# Patient Record
Sex: FEMALE | Race: BLACK OR AFRICAN AMERICAN | Employment: FULL TIME | URBAN - METROPOLITAN AREA
[De-identification: names, ages, dates, MRNs, and addresses within clinical notes are randomized per-mention and may not be internally consistent; named-entity substitution may affect disease eponyms.]

---

## 2022-04-10 ENCOUNTER — APPOINTMENT (OUTPATIENT)
Dept: CT IMAGING | Age: 37
End: 2022-04-10
Attending: NURSE PRACTITIONER

## 2022-04-10 ENCOUNTER — HOSPITAL ENCOUNTER (EMERGENCY)
Age: 37
Discharge: HOME | End: 2022-04-10
Attending: STUDENT IN AN ORGANIZED HEALTH CARE EDUCATION/TRAINING PROGRAM | Admitting: STUDENT IN AN ORGANIZED HEALTH CARE EDUCATION/TRAINING PROGRAM

## 2022-04-10 ENCOUNTER — APPOINTMENT (OUTPATIENT)
Dept: GENERAL RADIOLOGY | Age: 37
End: 2022-04-10
Attending: NURSE PRACTITIONER

## 2022-04-10 VITALS
DIASTOLIC BLOOD PRESSURE: 73 MMHG | SYSTOLIC BLOOD PRESSURE: 115 MMHG | BODY MASS INDEX: 20.83 KG/M2 | TEMPERATURE: 99.1 F | OXYGEN SATURATION: 97 % | RESPIRATION RATE: 18 BRPM | WEIGHT: 125 LBS | HEART RATE: 92 BPM | HEIGHT: 65 IN

## 2022-04-10 DIAGNOSIS — J98.19 LUNG COLLAPSE: ICD-10-CM

## 2022-04-10 DIAGNOSIS — R05.9 COUGH: Primary | ICD-10-CM

## 2022-04-10 LAB
ANION GAP SERPL CALC-SCNC: 5 MMOL/L (ref 5–15)
BASOPHILS # BLD: 0.1 K/UL (ref 0–0.1)
BASOPHILS NFR BLD: 1 % (ref 0–1)
BUN SERPL-MCNC: 6 MG/DL (ref 6–20)
BUN/CREAT SERPL: 6 (ref 12–20)
CALCIUM SERPL-MCNC: 9.3 MG/DL (ref 8.5–10.1)
CHLORIDE SERPL-SCNC: 104 MMOL/L (ref 97–108)
CO2 SERPL-SCNC: 27 MMOL/L (ref 21–32)
COMMENT, HOLDF: NORMAL
CREAT SERPL-MCNC: 0.94 MG/DL (ref 0.55–1.02)
DIFFERENTIAL METHOD BLD: ABNORMAL
EOSINOPHIL # BLD: 2.8 K/UL (ref 0–0.4)
EOSINOPHIL NFR BLD: 26 % (ref 0–7)
ERYTHROCYTE [DISTWIDTH] IN BLOOD BY AUTOMATED COUNT: 13.2 % (ref 11.5–14.5)
GLUCOSE SERPL-MCNC: 107 MG/DL (ref 65–100)
HCT VFR BLD AUTO: 37.2 % (ref 35–47)
HGB BLD-MCNC: 12.1 G/DL (ref 11.5–16)
IMM GRANULOCYTES # BLD AUTO: 0 K/UL (ref 0–0.04)
IMM GRANULOCYTES NFR BLD AUTO: 0 % (ref 0–0.5)
LYMPHOCYTES # BLD: 1.6 K/UL (ref 0.8–3.5)
LYMPHOCYTES NFR BLD: 15 % (ref 12–49)
MCH RBC QN AUTO: 28.4 PG (ref 26–34)
MCHC RBC AUTO-ENTMCNC: 32.5 G/DL (ref 30–36.5)
MCV RBC AUTO: 87.3 FL (ref 80–99)
MONOCYTES # BLD: 1 K/UL (ref 0–1)
MONOCYTES NFR BLD: 9 % (ref 5–13)
NEUTS SEG # BLD: 5.1 K/UL (ref 1.8–8)
NEUTS SEG NFR BLD: 49 % (ref 32–75)
NRBC # BLD: 0 K/UL (ref 0–0.01)
NRBC BLD-RTO: 0 PER 100 WBC
PLATELET # BLD AUTO: 588 K/UL (ref 150–400)
PMV BLD AUTO: 9.1 FL (ref 8.9–12.9)
POTASSIUM SERPL-SCNC: 3.5 MMOL/L (ref 3.5–5.1)
RBC # BLD AUTO: 4.26 M/UL (ref 3.8–5.2)
RBC MORPH BLD: ABNORMAL
SAMPLES BEING HELD,HOLD: NORMAL
SODIUM SERPL-SCNC: 136 MMOL/L (ref 136–145)
WBC # BLD AUTO: 10.6 K/UL (ref 3.6–11)

## 2022-04-10 PROCEDURE — 99285 EMERGENCY DEPT VISIT HI MDM: CPT

## 2022-04-10 PROCEDURE — 36415 COLL VENOUS BLD VENIPUNCTURE: CPT

## 2022-04-10 PROCEDURE — 85025 COMPLETE CBC W/AUTO DIFF WBC: CPT

## 2022-04-10 PROCEDURE — 74011000636 HC RX REV CODE- 636: Performed by: RADIOLOGY

## 2022-04-10 PROCEDURE — 80048 BASIC METABOLIC PNL TOTAL CA: CPT

## 2022-04-10 PROCEDURE — 71260 CT THORAX DX C+: CPT

## 2022-04-10 PROCEDURE — 71046 X-RAY EXAM CHEST 2 VIEWS: CPT

## 2022-04-10 RX ORDER — PREDNISONE 20 MG/1
40 TABLET ORAL DAILY
Qty: 10 TABLET | Refills: 0 | Status: SHIPPED | OUTPATIENT
Start: 2022-04-10 | End: 2022-04-15

## 2022-04-10 RX ORDER — GUAIFENESIN 1200 MG/1
1200 TABLET, EXTENDED RELEASE ORAL 2 TIMES DAILY
Qty: 14 TABLET | Refills: 0 | Status: SHIPPED | OUTPATIENT
Start: 2022-04-10 | End: 2022-04-17

## 2022-04-10 RX ORDER — DOXYCYCLINE HYCLATE 100 MG
100 TABLET ORAL 2 TIMES DAILY
Qty: 14 TABLET | Refills: 0 | Status: SHIPPED | OUTPATIENT
Start: 2022-04-10 | End: 2022-04-17

## 2022-04-10 RX ADMIN — IOPAMIDOL 100 ML: 612 INJECTION, SOLUTION INTRAVENOUS at 20:10

## 2022-04-10 NOTE — ED TRIAGE NOTES
Pt arrived from [facility] for further testing after having an X ray there. Unable to state what was seen on X ray. Reports cough, SOB X mid January after having COVID. Pt denies being vaccinated. Denies any other complaints.

## 2022-04-10 NOTE — ED PROVIDER NOTES
75-year-old female with a past medical history of asthma presents to the ER today for evaluation of cough. Patient states that she was diagnosed with COVID-19 on January 10, 2022, and since then has had a daily chronic cough. She notices very mild shortness of breath at times, but states that it is not limited her normal activities of daily living. She states that the cough started off a little bit green, became brown, and is now more of yellow appearance. She has been using her prescribed inhaler without any notable improvement in her symptoms. She states that the cough usually gets worse in the evening hours. She was seen in urgent care facility earlier today who completed a PA lateral chest x-ray and referred her here for unclear reasons. She denies any active fevers, myalgias, shortness of breath, chest pain, leg swelling. Patient was unvaccinated prior to cong COVID-19. History reviewed. No pertinent past medical history. No past surgical history on file. History reviewed. No pertinent family history. Social History     Socioeconomic History    Marital status: Not on file     Spouse name: Not on file    Number of children: Not on file    Years of education: Not on file    Highest education level: Not on file   Occupational History    Not on file   Tobacco Use    Smoking status: Not on file    Smokeless tobacco: Not on file   Substance and Sexual Activity    Alcohol use: Not on file    Drug use: Not on file    Sexual activity: Not on file   Other Topics Concern    Not on file   Social History Narrative    Not on file     Social Determinants of Health     Financial Resource Strain:     Difficulty of Paying Living Expenses: Not on file   Food Insecurity:     Worried About Running Out of Food in the Last Year: Not on file    Addison of Food in the Last Year: Not on file   Transportation Needs:     Lack of Transportation (Medical):  Not on file    Lack of Transportation (Non-Medical): Not on file   Physical Activity:     Days of Exercise per Week: Not on file    Minutes of Exercise per Session: Not on file   Stress:     Feeling of Stress : Not on file   Social Connections:     Frequency of Communication with Friends and Family: Not on file    Frequency of Social Gatherings with Friends and Family: Not on file    Attends [employer] Services: Not on file    Active Member of [address] [place] [address] or Organizations: Not on file    Attends Club or Organization Meetings: Not on file    Marital Status: Not on file   Intimate Partner Violence:     Fear of Current or Ex-Partner: Not on file    Emotionally Abused: Not on file    Physically Abused: Not on file    Sexually Abused: Not on file   Housing Stability:     Unable to Pay for Housing in the Last Year: Not on file    Number of [place] in the Last Year: Not on file    Unstable Housing in the Last Year: Not on file         ALLERGIES: Patient has no known allergies. Review of Systems   Constitutional: Negative for fever. HENT: Negative for sore throat. Eyes: Negative for visual disturbance. Respiratory: Positive for cough. Negative for shortness of breath. Cardiovascular: Negative for leg swelling. Gastrointestinal: Negative for vomiting. Genitourinary: Negative for dysuria. Musculoskeletal: Negative for myalgias. Skin: Negative for rash. Neurological: Negative for dizziness. Psychiatric/Behavioral: Negative for dysphoric mood. Vitals:    04/10/22 1753   BP: 115/73   Pulse: 92   Resp: 18   Temp: 99.1 °F (37.3 °C)   SpO2: 97%   Weight: 56.7 kg (125 lb)   Height: 5' 5\" (1.651 m)            Physical Exam  Vitals and nursing note reviewed. Constitutional:       General: She is not in acute distress. Appearance: Normal appearance. She is not ill-appearing. HENT:      Head: Normocephalic and atraumatic.       Right Ear: External ear normal.      Left Ear: External ear normal.      Nose: Nose normal.      Mouth/Throat:      Mouth: Mucous membranes are moist.      Pharynx: Oropharynx is clear. Eyes:      General: No scleral icterus. Extraocular Movements: Extraocular movements intact. Conjunctiva/sclera: Conjunctivae normal.      Pupils: Pupils are equal, round, and reactive to light. Cardiovascular:      Rate and Rhythm: Normal rate and regular rhythm. Pulses: Normal pulses. Heart sounds: Normal heart sounds. Pulmonary:      Effort: Pulmonary effort is normal. No tachypnea, accessory muscle usage or respiratory distress. Breath sounds: Normal breath sounds and air entry. No rhonchi or rales. Comments: Very faint wheezing that resolved after a few deep breaths  Abdominal:      General: Abdomen is flat. Bowel sounds are normal.      Palpations: Abdomen is soft. Musculoskeletal:         General: Normal range of motion. Cervical back: Normal range of motion and neck supple. No rigidity. No muscular tenderness. Skin:     General: Skin is warm and dry. Coloration: Skin is not pale. Neurological:      General: No focal deficit present. Mental Status: She is alert and oriented to person, place, and time. Psychiatric:         Mood and Affect: Mood normal.         Behavior: Behavior normal.         Thought Content: Thought content normal.         Judgment: Judgment normal.          MDM      VITAL SIGNS:  Patient Vitals for the past 4 hrs:   Temp Pulse Resp BP SpO2   04/10/22 1753 99.1 °F (37.3 °C) 92 18 115/73 97 %         LABS:  Recent Results (from the past 6 hour(s))   SAMPLES BEING HELD    Collection Time: 04/10/22  6:15 PM   Result Value Ref Range    SAMPLES BEING HELD 1BLU     COMMENT        Add-on orders for these samples will be processed based on acceptable specimen integrity and analyte stability, which may vary by analyte.    CBC WITH AUTOMATED DIFF    Collection Time: 04/10/22  6:15 PM   Result Value Ref Range    WBC 10.6 3.6 - 11.0 K/uL RBC 4.26 3.80 - 5.20 M/uL    HGB 12.1 11.5 - 16.0 g/dL    HCT 37.2 35.0 - 47.0 %    MCV 87.3 80.0 - 99.0 FL    MCH 28.4 26.0 - 34.0 PG    MCHC 32.5 30.0 - 36.5 g/dL    RDW 13.2 11.5 - 14.5 %    PLATELET 337 (H) 088 - 400 K/uL    MPV 9.1 8.9 - 12.9 FL    NRBC 0.0 0  WBC    ABSOLUTE NRBC 0.00 0.00 - 0.01 K/uL    NEUTROPHILS 49 32 - 75 %    LYMPHOCYTES 15 12 - 49 %    MONOCYTES 9 5 - 13 %    EOSINOPHILS 26 (H) 0 - 7 %    BASOPHILS 1 0 - 1 %    IMMATURE GRANULOCYTES 0 0.0 - 0.5 %    ABS. NEUTROPHILS 5.1 1.8 - 8.0 K/UL    ABS. LYMPHOCYTES 1.6 0.8 - 3.5 K/UL    ABS. MONOCYTES 1.0 0.0 - 1.0 K/UL    ABS. EOSINOPHILS 2.8 (H) 0.0 - 0.4 K/UL    ABS. BASOPHILS 0.1 0.0 - 0.1 K/UL    ABS. IMM. GRANS. 0.0 0.00 - 0.04 K/UL    DF SMEAR SCANNED      RBC COMMENTS NORMOCYTIC, NORMOCHROMIC     METABOLIC PANEL, BASIC    Collection Time: 04/10/22  6:15 PM   Result Value Ref Range    Sodium 136 136 - 145 mmol/L    Potassium 3.5 3.5 - 5.1 mmol/L    Chloride 104 97 - 108 mmol/L    CO2 27 21 - 32 mmol/L    Anion gap 5 5 - 15 mmol/L    Glucose 107 (H) 65 - 100 mg/dL    BUN 6 6 - 20 MG/DL    Creatinine 0.94 0.55 - 1.02 MG/DL    BUN/Creatinine ratio 6 (L) 12 - 20      GFR est AA >60 >60 ml/min/1.73m2    GFR est non-AA >60 >60 ml/min/1.73m2    Calcium 9.3 8.5 - 10.1 MG/DL        IMAGING:  CT CHEST W CONT   Final Result   1. Complete collapse of the right upper lobe. 2.  Fluid or secretion filled bronchi in the right upper lobe with mucus   plugging noted in the bilateral lower lobes. The collapse the right upper lobe   is likely related to mucous plugging. No discrete obstructing mass is   identified. 3.  Diffuse perihilar centrilobular nodules in the bilateral lower lobes which   may be secondary to aspiration or infectious bronchiolitis. In addition, small   nodular opacities are noted in the left apex. Consider infectious etiologies   including tuberculosis. XR CHEST PA LAT   Final Result   Right upper lobe collapse. Medications During Visit:  Medications   iopamidoL (ISOVUE 300) 61 % contrast injection 100 mL (100 mL IntraVENous Given 4/10/22 2010)         DECISION MAKING:  Marielos  is a 43 y.o. female who comes in as above. Work-up markable for collapse of right upper lobe of lung most likely due to mucous plugging. Case discussed with [facility] who recommended close outpatient follow-up. Patient will be prescribed mucolytic's, short course of corticosteroids, and antibiotics for possible atypical infection. The clinical decision making for this encounter included ordering and interpreting the above diagnostic tests with comparison to prior studies that are within our EMR. Past medical and surgical histories were reviewed, as were records from recent outpatient and emergency department visits. The above results discussed and reviewed with the patient. Patient verbalized understanding of the care plan, including any changes to current outpatient medication regimen, discussed disease process, symptom control, and follow-up care. Return precautions reviewed. IMPRESSION:  1. Cough    2. Lung collapse        DISPOSITION:  Discharged      Current Discharge Medication List      START taking these medications    Details   doxycycline (VIBRA-TABS) 100 mg tablet Take 1 Tablet by mouth two (2) times a day for 7 days. Qty: 14 Tablet, Refills: 0  Start date: 4/10/2022, End date: 4/17/2022      predniSONE (DELTASONE) 20 mg tablet Take 40 mg by mouth daily for 5 days. With Breakfast  Qty: 10 Tablet, Refills: 0  Start date: 4/10/2022, End date: 4/15/2022      guaiFENesin (Mucinex) 1,200 mg Ta12 ER tablet Take 1 Tablet by mouth two (2) times a day for 7 days.   Qty: 14 Tablet, Refills: 0  Start date: 4/10/2022, End date: 4/17/2022              Follow-up Information     Follow up With Specialties Details Why Contact Info    [facility]  Call   [address] [employer] [address]  [phone]            The patient is asked to follow-up with their primary care provider in the next several days. They are to call tomorrow for an appointment. The patient is asked to return promptly for any increased concerns or worsening of symptoms. They can return to this emergency department or any other emergency department.       Procedures

## 2022-04-11 NOTE — DISCHARGE INSTRUCTIONS
Take the prescribed medications to help with your symptoms.   Call [facility] first thing tomorrow and tell them something along these lines \"I was seen in the emergency department for a cough that has had for several months and they tell me that I have a partially collapsed right lung probably from mucous plugging and they wanted me to follow-up with you all as soon as possible\"

## 2022-07-09 ENCOUNTER — HOSPITAL ENCOUNTER (INPATIENT)
Age: 37
LOS: 2 days | Discharge: HOME | DRG: 193 | End: 2022-07-12
Attending: EMERGENCY MEDICINE | Admitting: HOSPITALIST
Payer: COMMERCIAL

## 2022-07-09 ENCOUNTER — APPOINTMENT (OUTPATIENT)
Dept: GENERAL RADIOLOGY | Age: 37
DRG: 193 | End: 2022-07-09
Attending: EMERGENCY MEDICINE
Payer: COMMERCIAL

## 2022-07-09 DIAGNOSIS — A41.9 SEPSIS, DUE TO UNSPECIFIED ORGANISM, UNSPECIFIED WHETHER ACUTE ORGAN DYSFUNCTION PRESENT (HCC): ICD-10-CM

## 2022-07-09 DIAGNOSIS — J18.9 COMMUNITY ACQUIRED PNEUMONIA OF RIGHT MIDDLE LOBE OF LUNG: Primary | ICD-10-CM

## 2022-07-09 DIAGNOSIS — J45.901 MODERATE ASTHMA WITH ACUTE EXACERBATION, UNSPECIFIED WHETHER PERSISTENT: ICD-10-CM

## 2022-07-09 LAB
ALBUMIN SERPL-MCNC: 3.2 G/DL (ref 3.5–5)
ALBUMIN/GLOB SERPL: 0.7 {RATIO} (ref 1.1–2.2)
ALP SERPL-CCNC: 54 U/L (ref 45–117)
ALT SERPL-CCNC: 19 U/L (ref 12–78)
ANION GAP SERPL CALC-SCNC: 6 MMOL/L (ref 5–15)
AST SERPL-CCNC: 10 U/L (ref 15–37)
BASOPHILS # BLD: 0.1 K/UL (ref 0–0.1)
BASOPHILS NFR BLD: 1 % (ref 0–1)
BILIRUB SERPL-MCNC: 0.5 MG/DL (ref 0.2–1)
BUN SERPL-MCNC: 14 MG/DL (ref 6–20)
BUN/CREAT SERPL: 13 (ref 12–20)
CALCIUM SERPL-MCNC: 8.7 MG/DL (ref 8.5–10.1)
CHLORIDE SERPL-SCNC: 106 MMOL/L (ref 97–108)
CO2 SERPL-SCNC: 28 MMOL/L (ref 21–32)
COMMENT, HOLDF: NORMAL
COVID-19 RAPID TEST, COVR: NOT DETECTED
CREAT SERPL-MCNC: 1.07 MG/DL (ref 0.55–1.02)
DIFFERENTIAL METHOD BLD: ABNORMAL
EOSINOPHIL # BLD: 1 K/UL (ref 0–0.4)
EOSINOPHIL NFR BLD: 9 % (ref 0–7)
ERYTHROCYTE [DISTWIDTH] IN BLOOD BY AUTOMATED COUNT: 13.8 % (ref 11.5–14.5)
GLOBULIN SER CALC-MCNC: 4.3 G/DL (ref 2–4)
GLUCOSE SERPL-MCNC: 121 MG/DL (ref 65–100)
HCT VFR BLD AUTO: 34 % (ref 35–47)
HGB BLD-MCNC: 11.2 G/DL (ref 11.5–16)
IMM GRANULOCYTES # BLD AUTO: 0 K/UL (ref 0–0.04)
IMM GRANULOCYTES NFR BLD AUTO: 0 % (ref 0–0.5)
LACTATE SERPL-SCNC: 2.5 MMOL/L (ref 0.4–2)
LYMPHOCYTES # BLD: 2.1 K/UL (ref 0.8–3.5)
LYMPHOCYTES NFR BLD: 18 % (ref 12–49)
MAGNESIUM SERPL-MCNC: 1.9 MG/DL (ref 1.6–2.4)
MCH RBC QN AUTO: 27.9 PG (ref 26–34)
MCHC RBC AUTO-ENTMCNC: 32.9 G/DL (ref 30–36.5)
MCV RBC AUTO: 84.8 FL (ref 80–99)
MONOCYTES # BLD: 1 K/UL (ref 0–1)
MONOCYTES NFR BLD: 9 % (ref 5–13)
NEUTS SEG # BLD: 7.4 K/UL (ref 1.8–8)
NEUTS SEG NFR BLD: 63 % (ref 32–75)
NRBC # BLD: 0 K/UL (ref 0–0.01)
NRBC BLD-RTO: 0 PER 100 WBC
PLATELET # BLD AUTO: 460 K/UL (ref 150–400)
PMV BLD AUTO: 9.2 FL (ref 8.9–12.9)
POTASSIUM SERPL-SCNC: 2.8 MMOL/L (ref 3.5–5.1)
PROCALCITONIN SERPL-MCNC: <0.05 NG/ML
PROT SERPL-MCNC: 7.5 G/DL (ref 6.4–8.2)
RBC # BLD AUTO: 4.01 M/UL (ref 3.8–5.2)
SAMPLES BEING HELD,HOLD: NORMAL
SODIUM SERPL-SCNC: 140 MMOL/L (ref 136–145)
SOURCE, COVRS: NORMAL
WBC # BLD AUTO: 11.5 K/UL (ref 3.6–11)

## 2022-07-09 PROCEDURE — 36415 COLL VENOUS BLD VENIPUNCTURE: CPT

## 2022-07-09 PROCEDURE — 94640 AIRWAY INHALATION TREATMENT: CPT

## 2022-07-09 PROCEDURE — 96365 THER/PROPH/DIAG IV INF INIT: CPT

## 2022-07-09 PROCEDURE — 74011250636 HC RX REV CODE- 250/636: Performed by: NURSE PRACTITIONER

## 2022-07-09 PROCEDURE — 93005 ELECTROCARDIOGRAM TRACING: CPT

## 2022-07-09 PROCEDURE — 84145 PROCALCITONIN (PCT): CPT

## 2022-07-09 PROCEDURE — 74011000250 HC RX REV CODE- 250: Performed by: NURSE PRACTITIONER

## 2022-07-09 PROCEDURE — 99285 EMERGENCY DEPT VISIT HI MDM: CPT

## 2022-07-09 PROCEDURE — 96375 TX/PRO/DX INJ NEW DRUG ADDON: CPT

## 2022-07-09 PROCEDURE — 80053 COMPREHEN METABOLIC PANEL: CPT

## 2022-07-09 PROCEDURE — 83605 ASSAY OF LACTIC ACID: CPT

## 2022-07-09 PROCEDURE — 85025 COMPLETE CBC W/AUTO DIFF WBC: CPT

## 2022-07-09 PROCEDURE — 71046 X-RAY EXAM CHEST 2 VIEWS: CPT

## 2022-07-09 PROCEDURE — 83735 ASSAY OF MAGNESIUM: CPT

## 2022-07-09 PROCEDURE — 87635 SARS-COV-2 COVID-19 AMP PRB: CPT

## 2022-07-09 PROCEDURE — 87040 BLOOD CULTURE FOR BACTERIA: CPT

## 2022-07-09 RX ORDER — POTASSIUM CHLORIDE 750 MG/1
40 TABLET, FILM COATED, EXTENDED RELEASE ORAL
Status: COMPLETED | OUTPATIENT
Start: 2022-07-09 | End: 2022-07-10

## 2022-07-09 RX ORDER — MAGNESIUM SULFATE HEPTAHYDRATE 40 MG/ML
2 INJECTION, SOLUTION INTRAVENOUS ONCE
Status: COMPLETED | OUTPATIENT
Start: 2022-07-09 | End: 2022-07-10

## 2022-07-09 RX ORDER — IPRATROPIUM BROMIDE AND ALBUTEROL SULFATE 2.5; .5 MG/3ML; MG/3ML
3 SOLUTION RESPIRATORY (INHALATION)
Status: COMPLETED | OUTPATIENT
Start: 2022-07-09 | End: 2022-07-09

## 2022-07-09 RX ADMIN — SODIUM CHLORIDE 1000 ML: 9 INJECTION, SOLUTION INTRAVENOUS at 22:47

## 2022-07-09 RX ADMIN — CEFTRIAXONE SODIUM 1 G: 1 INJECTION, POWDER, FOR SOLUTION INTRAMUSCULAR; INTRAVENOUS at 22:48

## 2022-07-09 RX ADMIN — AZITHROMYCIN MONOHYDRATE 500 MG: 500 INJECTION, POWDER, LYOPHILIZED, FOR SOLUTION INTRAVENOUS at 22:48

## 2022-07-09 RX ADMIN — IPRATROPIUM BROMIDE AND ALBUTEROL SULFATE 3 ML: .5; 3 SOLUTION RESPIRATORY (INHALATION) at 22:48

## 2022-07-09 RX ADMIN — METHYLPREDNISOLONE SODIUM SUCCINATE 80 MG: 40 INJECTION, POWDER, FOR SOLUTION INTRAMUSCULAR; INTRAVENOUS at 22:48

## 2022-07-09 NOTE — Clinical Note
Status[de-identified] INPATIENT [101]   Type of Bed: Telemetry [19]   Cardiac Monitoring Required?: Yes   Inpatient Hospitalization Certified Necessary for the Following Reasons: 9.  Other (further clarification in H&P documentation)   Admitting Diagnosis: PNA (pneumonia) [[id number]]   Admitting Physician: Dary Berger [[id number]]   Attending Physician: Malou Peoples   Estimated Length of Stay: 2 Midnights   Discharge Plan[de-identified] Home with Office Follow-up

## 2022-07-10 PROBLEM — J18.9 CAP (COMMUNITY ACQUIRED PNEUMONIA): Status: ACTIVE | Noted: 2022-07-10

## 2022-07-10 PROBLEM — J45.901 ASTHMA EXACERBATION: Status: ACTIVE | Noted: 2022-07-10

## 2022-07-10 PROBLEM — J18.9 PNA (PNEUMONIA): Status: ACTIVE | Noted: 2022-07-10

## 2022-07-10 LAB
ALBUMIN SERPL-MCNC: 2.6 G/DL (ref 3.5–5)
ALBUMIN/GLOB SERPL: 0.5 {RATIO} (ref 1.1–2.2)
ALP SERPL-CCNC: 50 U/L (ref 45–117)
ALT SERPL-CCNC: 13 U/L (ref 12–78)
ANION GAP SERPL CALC-SCNC: 7 MMOL/L (ref 5–15)
APPEARANCE UR: CLEAR
ARTERIAL PATENCY WRIST A: POSITIVE
AST SERPL-CCNC: 11 U/L (ref 15–37)
ATRIAL RATE: 117 BPM
BACTERIA URNS QL MICRO: NEGATIVE /HPF
BASE DEFICIT BLD-SCNC: 0.1 MMOL/L
BASE DEFICIT BLD-SCNC: 0.5 MMOL/L
BDY SITE: ABNORMAL
BILIRUB SERPL-MCNC: 0.3 MG/DL (ref 0.2–1)
BILIRUB UR QL: NEGATIVE
BUN SERPL-MCNC: 9 MG/DL (ref 6–20)
BUN/CREAT SERPL: 15 (ref 12–20)
CALCIUM SERPL-MCNC: 8.8 MG/DL (ref 8.5–10.1)
CALCULATED P AXIS, ECG09: 74 DEGREES
CALCULATED R AXIS, ECG10: 71 DEGREES
CALCULATED T AXIS, ECG11: 45 DEGREES
CHLORIDE SERPL-SCNC: 109 MMOL/L (ref 97–108)
CO2 SERPL-SCNC: 24 MMOL/L (ref 21–32)
COLOR UR: ABNORMAL
CREAT SERPL-MCNC: 0.62 MG/DL (ref 0.55–1.02)
DIAGNOSIS, 93000: NORMAL
EPITH CASTS URNS QL MICRO: ABNORMAL /LPF
ERYTHROCYTE [DISTWIDTH] IN BLOOD BY AUTOMATED COUNT: 14 % (ref 11.5–14.5)
GAS FLOW.O2 O2 DELIVERY SYS: ABNORMAL L/MIN
GLOBULIN SER CALC-MCNC: 4.8 G/DL (ref 2–4)
GLUCOSE SERPL-MCNC: 159 MG/DL (ref 65–100)
GLUCOSE UR STRIP.AUTO-MCNC: 500 MG/DL
HCO3 BLD-SCNC: 24.4 MMOL/L (ref 22–26)
HCO3 BLD-SCNC: 24.7 MMOL/L (ref 22–26)
HCT VFR BLD AUTO: 33.3 % (ref 35–47)
HGB BLD-MCNC: 10.9 G/DL (ref 11.5–16)
HGB UR QL STRIP: ABNORMAL
HYALINE CASTS URNS QL MICRO: ABNORMAL /LPF (ref 0–5)
KETONES UR QL STRIP.AUTO: NEGATIVE MG/DL
LACTATE BLD-SCNC: 1.07 MMOL/L (ref 0.4–2)
LACTATE SERPL-SCNC: 2.3 MMOL/L (ref 0.4–2)
LEUKOCYTE ESTERASE UR QL STRIP.AUTO: NEGATIVE
MAGNESIUM SERPL-MCNC: 2.9 MG/DL (ref 1.6–2.4)
MCH RBC QN AUTO: 27.7 PG (ref 26–34)
MCHC RBC AUTO-ENTMCNC: 32.7 G/DL (ref 30–36.5)
MCV RBC AUTO: 84.7 FL (ref 80–99)
NITRITE UR QL STRIP.AUTO: NEGATIVE
NRBC # BLD: 0 K/UL (ref 0–0.01)
NRBC BLD-RTO: 0 PER 100 WBC
P-R INTERVAL, ECG05: 124 MS
PCO2 BLD: 40 MMHG (ref 35–45)
PCO2 BLD: 40.2 MMHG (ref 35–45)
PH BLD: 7.39 [PH] (ref 7.35–7.45)
PH BLD: 7.4 [PH] (ref 7.35–7.45)
PH UR STRIP: 6.5 [PH] (ref 5–8)
PHOSPHATE SERPL-MCNC: 3.1 MG/DL (ref 2.6–4.7)
PLATELET # BLD AUTO: 435 K/UL (ref 150–400)
PMV BLD AUTO: 9.3 FL (ref 8.9–12.9)
PO2 BLD: 29 MMHG (ref 80–100)
PO2 BLD: 75 MMHG (ref 80–100)
POTASSIUM SERPL-SCNC: 4.1 MMOL/L (ref 3.5–5.1)
PROCALCITONIN SERPL-MCNC: <0.05 NG/ML
PROT SERPL-MCNC: 7.4 G/DL (ref 6.4–8.2)
PROT UR STRIP-MCNC: NEGATIVE MG/DL
Q-T INTERVAL, ECG07: 320 MS
QRS DURATION, ECG06: 74 MS
QTC CALCULATION (BEZET), ECG08: 446 MS
RBC # BLD AUTO: 3.93 M/UL (ref 3.8–5.2)
RBC #/AREA URNS HPF: ABNORMAL /HPF (ref 0–5)
SAO2 % BLD: 55.2 % (ref 92–97)
SAO2 % BLD: 94.8 % (ref 92–97)
SERVICE CMNT-IMP: ABNORMAL
SODIUM SERPL-SCNC: 140 MMOL/L (ref 136–145)
SP GR UR REFRACTOMETRY: 1.02 (ref 1–1.03)
SPECIMEN TYPE: ABNORMAL
SPECIMEN TYPE: ABNORMAL
UR CULT HOLD, URHOLD: NORMAL
UROBILINOGEN UR QL STRIP.AUTO: 0.2 EU/DL (ref 0.2–1)
VENTRICULAR RATE, ECG03: 117 BPM
WBC # BLD AUTO: 11.8 K/UL (ref 3.6–11)
WBC URNS QL MICRO: ABNORMAL /HPF (ref 0–4)

## 2022-07-10 PROCEDURE — 83605 ASSAY OF LACTIC ACID: CPT

## 2022-07-10 PROCEDURE — 74011250637 HC RX REV CODE- 250/637: Performed by: NURSE PRACTITIONER

## 2022-07-10 PROCEDURE — 80053 COMPREHEN METABOLIC PANEL: CPT

## 2022-07-10 PROCEDURE — 96367 TX/PROPH/DG ADDL SEQ IV INF: CPT

## 2022-07-10 PROCEDURE — 96366 THER/PROPH/DIAG IV INF ADDON: CPT

## 2022-07-10 PROCEDURE — 74011250636 HC RX REV CODE- 250/636: Performed by: STUDENT IN AN ORGANIZED HEALTH CARE EDUCATION/TRAINING PROGRAM

## 2022-07-10 PROCEDURE — 82803 BLOOD GASES ANY COMBINATION: CPT

## 2022-07-10 PROCEDURE — 36600 WITHDRAWAL OF ARTERIAL BLOOD: CPT

## 2022-07-10 PROCEDURE — 83735 ASSAY OF MAGNESIUM: CPT

## 2022-07-10 PROCEDURE — 36415 COLL VENOUS BLD VENIPUNCTURE: CPT

## 2022-07-10 PROCEDURE — 96365 THER/PROPH/DIAG IV INF INIT: CPT

## 2022-07-10 PROCEDURE — G0378 HOSPITAL OBSERVATION PER HR: HCPCS

## 2022-07-10 PROCEDURE — 85027 COMPLETE CBC AUTOMATED: CPT

## 2022-07-10 PROCEDURE — 94640 AIRWAY INHALATION TREATMENT: CPT

## 2022-07-10 PROCEDURE — 65270000029 HC RM PRIVATE

## 2022-07-10 PROCEDURE — 96376 TX/PRO/DX INJ SAME DRUG ADON: CPT

## 2022-07-10 PROCEDURE — 81001 URINALYSIS AUTO W/SCOPE: CPT

## 2022-07-10 PROCEDURE — 74011000258 HC RX REV CODE- 258: Performed by: STUDENT IN AN ORGANIZED HEALTH CARE EDUCATION/TRAINING PROGRAM

## 2022-07-10 PROCEDURE — 74011250636 HC RX REV CODE- 250/636: Performed by: NURSE PRACTITIONER

## 2022-07-10 PROCEDURE — 74011000250 HC RX REV CODE- 250: Performed by: STUDENT IN AN ORGANIZED HEALTH CARE EDUCATION/TRAINING PROGRAM

## 2022-07-10 PROCEDURE — 84100 ASSAY OF PHOSPHORUS: CPT

## 2022-07-10 PROCEDURE — 96375 TX/PRO/DX INJ NEW DRUG ADDON: CPT

## 2022-07-10 PROCEDURE — 84145 PROCALCITONIN (PCT): CPT

## 2022-07-10 PROCEDURE — 74011250637 HC RX REV CODE- 250/637: Performed by: HOSPITALIST

## 2022-07-10 PROCEDURE — 94762 N-INVAS EAR/PLS OXIMTRY CONT: CPT

## 2022-07-10 PROCEDURE — 74011000250 HC RX REV CODE- 250: Performed by: HOSPITALIST

## 2022-07-10 PROCEDURE — 94664 DEMO&/EVAL PT USE INHALER: CPT

## 2022-07-10 PROCEDURE — 74011250636 HC RX REV CODE- 250/636: Performed by: HOSPITALIST

## 2022-07-10 RX ORDER — IPRATROPIUM BROMIDE AND ALBUTEROL SULFATE 2.5; .5 MG/3ML; MG/3ML
3 SOLUTION RESPIRATORY (INHALATION)
Status: DISCONTINUED | OUTPATIENT
Start: 2022-07-10 | End: 2022-07-11

## 2022-07-10 RX ORDER — GUAIFENESIN 600 MG/1
600 TABLET, EXTENDED RELEASE ORAL
Status: DISCONTINUED | OUTPATIENT
Start: 2022-07-10 | End: 2022-07-10

## 2022-07-10 RX ORDER — ACETAMINOPHEN 325 MG/1
650 TABLET ORAL
Status: DISCONTINUED | OUTPATIENT
Start: 2022-07-10 | End: 2022-07-12 | Stop reason: HOSPADM

## 2022-07-10 RX ORDER — KETOROLAC TROMETHAMINE 30 MG/ML
15 INJECTION, SOLUTION INTRAMUSCULAR; INTRAVENOUS
Status: COMPLETED | OUTPATIENT
Start: 2022-07-10 | End: 2022-07-10

## 2022-07-10 RX ORDER — POLYETHYLENE GLYCOL 3350 17 G/17G
17 POWDER, FOR SOLUTION ORAL DAILY PRN
Status: DISCONTINUED | OUTPATIENT
Start: 2022-07-10 | End: 2022-07-12 | Stop reason: HOSPADM

## 2022-07-10 RX ORDER — BUDESONIDE 0.25 MG/2ML
250 INHALANT ORAL
Status: DISCONTINUED | OUTPATIENT
Start: 2022-07-10 | End: 2022-07-10

## 2022-07-10 RX ORDER — PREDNISONE 20 MG/1
20 TABLET ORAL
COMMUNITY
End: 2022-07-12

## 2022-07-10 RX ORDER — IPRATROPIUM BROMIDE AND ALBUTEROL SULFATE 2.5; .5 MG/3ML; MG/3ML
3 SOLUTION RESPIRATORY (INHALATION)
Status: DISCONTINUED | OUTPATIENT
Start: 2022-07-10 | End: 2022-07-12 | Stop reason: HOSPADM

## 2022-07-10 RX ORDER — SODIUM CHLORIDE 0.9 % (FLUSH) 0.9 %
5-40 SYRINGE (ML) INJECTION EVERY 8 HOURS
Status: DISCONTINUED | OUTPATIENT
Start: 2022-07-10 | End: 2022-07-12 | Stop reason: HOSPADM

## 2022-07-10 RX ORDER — GUAIFENESIN 600 MG/1
600 TABLET, EXTENDED RELEASE ORAL EVERY 12 HOURS
Status: DISCONTINUED | OUTPATIENT
Start: 2022-07-10 | End: 2022-07-12 | Stop reason: HOSPADM

## 2022-07-10 RX ORDER — SODIUM CHLORIDE, SODIUM LACTATE, POTASSIUM CHLORIDE, CALCIUM CHLORIDE 600; 310; 30; 20 MG/100ML; MG/100ML; MG/100ML; MG/100ML
75 INJECTION, SOLUTION INTRAVENOUS CONTINUOUS
Status: DISCONTINUED | OUTPATIENT
Start: 2022-07-10 | End: 2022-07-10

## 2022-07-10 RX ORDER — ONDANSETRON 2 MG/ML
4 INJECTION INTRAMUSCULAR; INTRAVENOUS
Status: DISCONTINUED | OUTPATIENT
Start: 2022-07-10 | End: 2022-07-12 | Stop reason: HOSPADM

## 2022-07-10 RX ORDER — ACETAMINOPHEN 650 MG/1
650 SUPPOSITORY RECTAL
Status: DISCONTINUED | OUTPATIENT
Start: 2022-07-10 | End: 2022-07-12 | Stop reason: HOSPADM

## 2022-07-10 RX ORDER — ONDANSETRON 2 MG/ML
4 INJECTION INTRAMUSCULAR; INTRAVENOUS
Status: COMPLETED | OUTPATIENT
Start: 2022-07-10 | End: 2022-07-10

## 2022-07-10 RX ORDER — ALBUTEROL SULFATE 0.83 MG/ML
SOLUTION RESPIRATORY (INHALATION)
COMMUNITY
End: 2022-07-12

## 2022-07-10 RX ORDER — BUDESONIDE 0.25 MG/2ML
250 INHALANT ORAL
Status: DISCONTINUED | OUTPATIENT
Start: 2022-07-10 | End: 2022-07-12 | Stop reason: HOSPADM

## 2022-07-10 RX ORDER — SODIUM CHLORIDE 0.9 % (FLUSH) 0.9 %
5-40 SYRINGE (ML) INJECTION AS NEEDED
Status: DISCONTINUED | OUTPATIENT
Start: 2022-07-10 | End: 2022-07-12 | Stop reason: HOSPADM

## 2022-07-10 RX ORDER — ONDANSETRON 4 MG/1
4 TABLET, ORALLY DISINTEGRATING ORAL
Status: DISCONTINUED | OUTPATIENT
Start: 2022-07-10 | End: 2022-07-12 | Stop reason: HOSPADM

## 2022-07-10 RX ORDER — FLUTICASONE PROPIONATE AND SALMETEROL 250; 50 UG/1; UG/1
1 POWDER RESPIRATORY (INHALATION) EVERY 12 HOURS
COMMUNITY
End: 2022-08-17 | Stop reason: ALTCHOICE

## 2022-07-10 RX ORDER — MONTELUKAST SODIUM 10 MG/1
10 TABLET ORAL
Status: DISCONTINUED | OUTPATIENT
Start: 2022-07-10 | End: 2022-07-12 | Stop reason: HOSPADM

## 2022-07-10 RX ADMIN — METHYLPREDNISOLONE SODIUM SUCCINATE 40 MG: 40 INJECTION, POWDER, FOR SOLUTION INTRAMUSCULAR; INTRAVENOUS at 21:47

## 2022-07-10 RX ADMIN — MONTELUKAST 10 MG: 10 TABLET, FILM COATED ORAL at 22:46

## 2022-07-10 RX ADMIN — MAGNESIUM SULFATE IN WATER 2 G: 40 INJECTION, SOLUTION INTRAVENOUS at 02:17

## 2022-07-10 RX ADMIN — GUAIFENESIN 600 MG: 600 TABLET, EXTENDED RELEASE ORAL at 20:36

## 2022-07-10 RX ADMIN — SODIUM CHLORIDE, PRESERVATIVE FREE 20 MG: 5 INJECTION INTRAVENOUS at 09:24

## 2022-07-10 RX ADMIN — IPRATROPIUM BROMIDE AND ALBUTEROL SULFATE 3 ML: .5; 3 SOLUTION RESPIRATORY (INHALATION) at 20:34

## 2022-07-10 RX ADMIN — ONDANSETRON HYDROCHLORIDE 4 MG: 2 SOLUTION INTRAMUSCULAR; INTRAVENOUS at 00:13

## 2022-07-10 RX ADMIN — AZITHROMYCIN MONOHYDRATE 500 MG: 500 INJECTION, POWDER, LYOPHILIZED, FOR SOLUTION INTRAVENOUS at 10:15

## 2022-07-10 RX ADMIN — POTASSIUM CHLORIDE 40 MEQ: 750 TABLET, FILM COATED, EXTENDED RELEASE ORAL at 00:12

## 2022-07-10 RX ADMIN — SODIUM CHLORIDE, PRESERVATIVE FREE 10 ML: 5 INJECTION INTRAVENOUS at 22:46

## 2022-07-10 RX ADMIN — SODIUM CHLORIDE, POTASSIUM CHLORIDE, SODIUM LACTATE AND CALCIUM CHLORIDE 75 ML/HR: 600; 310; 30; 20 INJECTION, SOLUTION INTRAVENOUS at 09:24

## 2022-07-10 RX ADMIN — IPRATROPIUM BROMIDE AND ALBUTEROL SULFATE 3 ML: .5; 3 SOLUTION RESPIRATORY (INHALATION) at 09:06

## 2022-07-10 RX ADMIN — BUDESONIDE 250 MCG: 0.25 INHALANT RESPIRATORY (INHALATION) at 20:32

## 2022-07-10 RX ADMIN — IPRATROPIUM BROMIDE AND ALBUTEROL SULFATE 3 ML: .5; 3 SOLUTION RESPIRATORY (INHALATION) at 14:06

## 2022-07-10 RX ADMIN — KETOROLAC TROMETHAMINE 15 MG: 30 INJECTION, SOLUTION INTRAMUSCULAR; INTRAVENOUS at 00:12

## 2022-07-10 RX ADMIN — CEFTRIAXONE SODIUM 1 G: 1 INJECTION, POWDER, FOR SOLUTION INTRAMUSCULAR; INTRAVENOUS at 09:28

## 2022-07-10 RX ADMIN — METHYLPREDNISOLONE SODIUM SUCCINATE 40 MG: 40 INJECTION, POWDER, FOR SOLUTION INTRAMUSCULAR; INTRAVENOUS at 16:32

## 2022-07-10 NOTE — ED PROVIDER NOTES
53-year-old female with past medical history of asthma presents the ER today for evaluation of shortness of breath. Patient states that she started to feel short of breath on Wednesday of this week. At that time, she went to an urgent care facility where she got prescriptions for 2 different inhalers and a course of prednisone. She has been taking all the medications as prescribed, but continues to have worsening symptoms. She states that over the last 2 days she has had to use her inhaler as often as every 15 minutes. She states that she started a new rescue Hailer today and is already used it 42 times. Despite the frequent usage, she continues to have chest tightness, shortness of breath, and wheezing. She is also having some chills without objective fever. She is having a productive cough with yellow phlegm production. She denies any chest pain, leg swelling, abdominal pain, vomiting. Past Medical History:   Diagnosis Date    Asthma        History reviewed. No pertinent surgical history. History reviewed. No pertinent family history. Social History     Socioeconomic History    Marital status: SINGLE     Spouse name: Not on file    Number of children: Not on file    Years of education: Not on file    Highest education level: Not on file   Occupational History    Not on file   Tobacco Use    Smoking status: Not on file    Smokeless tobacco: Not on file   Substance and Sexual Activity    Alcohol use: Not on file    Drug use: Not on file    Sexual activity: Not on file   Other Topics Concern    Not on file   Social History Narrative    Not on file     Social Determinants of Health     Financial Resource Strain:     Difficulty of Paying Living Expenses: Not on file   Food Insecurity:     Worried About Running Out of Food in the Last Year: Not on file    Add[place] of Food in the Last Year: Not on file   Transportation Needs:     Lack of Transportation (Medical):  Not on file    Lack of Transportation (Non-Medical): Not on file   Physical Activity:     Days of Exercise per Week: Not on file    Minutes of Exercise per Session: Not on file   Stress:     Feeling of Stress : Not on file   Social Connections:     Frequency of Communication with Friends and Family: Not on file    Frequency of Social Gatherings with Friends and Family: Not on file    Attends [employer] Services: Not on file    Active Member of [address] [place] [address] or Organizations: Not on file    Attends Club or Organization Meetings: Not on file    Marital Status: Not on file   Intimate Partner Violence:     Fear of Current or Ex-Partner: Not on file    Emotionally Abused: Not on file    Physically Abused: Not on file    Sexually Abused: Not on file   Housing Stability:     Unable to Pay for Housing in the Last Year: Not on file    Number of [place] in the Last Year: Not on file    Unstable Housing in the Last Year: Not on file         ALLERGIES: Patient has no known allergies. Review of Systems   Constitutional: Positive for appetite change, chills and fatigue. HENT: Negative for sore throat. Eyes: Negative for visual disturbance. Respiratory: Positive for cough, chest tightness and shortness of breath. Cardiovascular: Negative for leg swelling. Gastrointestinal: Negative for vomiting. Genitourinary: Negative for dysuria. Musculoskeletal: Negative for myalgias. Skin: Negative for rash. Neurological: Negative for dizziness. Psychiatric/Behavioral: The patient is nervous/anxious. Vitals:    07/09/22 2104   BP: 114/63   Pulse: (!) 126   Resp: 22   Temp: 98.8 °F (37.1 °C)   SpO2: 96%   Weight: 57.6 kg (127 lb)   Height: 5' 6\" (1.676 m)            Physical Exam  Vitals and nursing note reviewed. Constitutional:       General: She is in acute distress. Appearance: Normal appearance. She is well-developed. She is ill-appearing. HENT:      Head: Normocephalic and atraumatic. Right Ear: External ear normal.      Left Ear: External ear normal.      Nose: Nose normal.      Mouth/Throat:      Mouth: Mucous membranes are moist.      Pharynx: Oropharynx is clear. Eyes:      General: No scleral icterus. Extraocular Movements: Extraocular movements intact. Conjunctiva/sclera: Conjunctivae normal.      Pupils: Pupils are equal, round, and reactive to light. Cardiovascular:      Rate and Rhythm: Regular rhythm. Tachycardia present. Pulses: Normal pulses. Heart sounds: Normal heart sounds. No murmur heard. Pulmonary:      Effort: Tachypnea, accessory muscle usage and prolonged expiration present. Breath sounds: Decreased breath sounds and wheezing present. No rales. Abdominal:      General: Abdomen is flat. Bowel sounds are normal.      Palpations: Abdomen is soft. Musculoskeletal:         General: Normal range of motion. Cervical back: Normal range of motion and neck supple. No rigidity. No muscular tenderness. Skin:     General: Skin is warm and dry. Coloration: Skin is not pale. Neurological:      General: No focal deficit present. Mental Status: She is alert and oriented to person, place, and time. Psychiatric:         Mood and Affect: Mood normal.         Behavior: Behavior normal.         Thought Content:  Thought content normal.         Judgment: Judgment normal.          MDM      VITAL SIGNS:  Patient Vitals for the past 4 hrs:   Temp Pulse Resp BP SpO2   07/09/22 2104 98.8 °F (37.1 °C) (!) 126 22 114/63 96 %         LABS:  Recent Results (from the past 6 hour(s))   CBC WITH AUTOMATED DIFF    Collection Time: 07/09/22  9:40 PM   Result Value Ref Range    WBC 11.5 (H) 3.6 - 11.0 K/uL    RBC 4.01 3.80 - 5.20 M/uL    HGB 11.2 (L) 11.5 - 16.0 g/dL    HCT 34.0 (L) 35.0 - 47.0 %    MCV 84.8 80.0 - 99.0 FL    MCH 27.9 26.0 - 34.0 PG    MCHC 32.9 30.0 - 36.5 g/dL    RDW 13.8 11.5 - 14.5 %    PLATELET 700 (H) 985 - 400 K/uL    MPV 9.2 8.9 - 12.9 FL    NRBC 0.0 0  WBC    ABSOLUTE NRBC 0.00 0.00 - 0.01 K/uL    NEUTROPHILS 63 32 - 75 %    LYMPHOCYTES 18 12 - 49 %    MONOCYTES 9 5 - 13 %    EOSINOPHILS 9 (H) 0 - 7 %    BASOPHILS 1 0 - 1 %    IMMATURE GRANULOCYTES 0 0.0 - 0.5 %    ABS. NEUTROPHILS 7.4 1.8 - 8.0 K/UL    ABS. LYMPHOCYTES 2.1 0.8 - 3.5 K/UL    ABS. MONOCYTES 1.0 0.0 - 1.0 K/UL    ABS. EOSINOPHILS 1.0 (H) 0.0 - 0.4 K/UL    ABS. BASOPHILS 0.1 0.0 - 0.1 K/UL    ABS. IMM. GRANS. 0.0 0.00 - 0.04 K/UL    DF AUTOMATED     METABOLIC PANEL, COMPREHENSIVE    Collection Time: 07/09/22  9:40 PM   Result Value Ref Range    Sodium 140 136 - 145 mmol/L    Potassium 2.8 (L) 3.5 - 5.1 mmol/L    Chloride 106 97 - 108 mmol/L    CO2 28 21 - 32 mmol/L    Anion gap 6 5 - 15 mmol/L    Glucose 121 (H) 65 - 100 mg/dL    BUN 14 6 - 20 MG/DL    Creatinine 1.07 (H) 0.55 - 1.02 MG/DL    BUN/Creatinine ratio 13 12 - 20      GFR est AA >60 >60 ml/min/1.73m2    GFR est non-AA 57 (L) >60 ml/min/1.73m2    Calcium 8.7 8.5 - 10.1 MG/DL    Bilirubin, total 0.5 0.2 - 1.0 MG/DL    ALT (SGPT) 19 12 - 78 U/L    AST (SGOT) 10 (L) 15 - 37 U/L    Alk. phosphatase 54 45 - 117 U/L    Protein, total 7.5 6.4 - 8.2 g/dL    Albumin 3.2 (L) 3.5 - 5.0 g/dL    Globulin 4.3 (H) 2.0 - 4.0 g/dL    A-G Ratio 0.7 (L) 1.1 - 2.2     LACTIC ACID    Collection Time: 07/09/22  9:40 PM   Result Value Ref Range    Lactic acid 2.5 (HH) 0.4 - 2.0 MMOL/L   SAMPLES BEING HELD    Collection Time: 07/09/22  9:40 PM   Result Value Ref Range    SAMPLES BEING HELD 1blu     COMMENT        Add-on orders for these samples will be processed based on acceptable specimen integrity and analyte stability, which may vary by analyte.    PROCALCITONIN    Collection Time: 07/09/22  9:40 PM   Result Value Ref Range    Procalcitonin <0.05 ng/mL   MAGNESIUM    Collection Time: 07/09/22  9:40 PM   Result Value Ref Range    Magnesium 1.9 1.6 - 2.4 mg/dL   COVID-19 RAPID TEST    Collection Time: 07/09/22  9:44 PM   Result Value Ref Range    Specimen source Nasopharyngeal      COVID-19 rapid test Not detected NOTD          IMAGING:  XR CHEST PA LAT   Final Result   Hazy consolidation in the right midlung is concerning for pneumonia. Medications During Visit:  Medications   sodium chloride 0.9 % bolus infusion 1,000 mL (1,000 mL IntraVENous New Bag 7/9/22 2247)   cefTRIAXone (ROCEPHIN) 1 g in 0.9% sodium chloride 10 mL IV syringe (1 g IntraVENous Given 7/9/22 2248)   azithromycin (ZITHROMAX) 500 mg in 0.9% sodium chloride 250 mL ([id number]) (500 mg IntraVENous New Bag 7/9/22 2248)   albuterol-ipratropium (DUO-NEB) 2.5 MG-0.5 MG/3 ML (0 mL Nebulization Held 7/9/22 2138)   magnesium sulfate 2 g/50 ml IVPB (premix or compounded) (has no administration in time range)   potassium chloride SR (KLOR-CON 10) tablet 40 mEq (has no administration in time range)   methylPREDNISolone (PF) (SOLU-MEDROL) injection 80 mg (80 mg IntraVENous Given 7/9/22 2248)         DECISION MAKING:  Brionna Cruz is a 42 y.o. female who comes in as above. Work-up remarkable for right-sided pneumonia with clinical suspicion for sepsis (fever, tachycardia, tachypnea, elevated lactic acid). Patient continued to wheeze after magnesium, corticosteroids, and DuoNeb. She will be started on treatment for CAP and will be admitted to medicine for further management. Perfect Serve Consult for Admission  10:50 PM    ED Room Number: R33/R33  Patient Name and age:  Brionna Cruz 42 y.o.  female  Working Diagnosis:   1. Community acquired pneumonia of right middle lobe of lung    2. Moderate asthma with acute exacerbation, unspecified whether persistent    3.  Sepsis, due to unspecified organism, unspecified whether acute organ dysfunction present ([facility] [address]        COVID-19 Suspicion:  no  Sepsis present:  yes  Reassessment needed: no  Code Status:  Full Code  Readmission: no  Isolation Requirements:  no  Recommended Level of Care:  telemetry  Department:[facility] Adult ED - [phone]  Other: Asthma exacerbation, community-acquired pneumonia, sepsis. IMPRESSION:  1. Community acquired pneumonia of right middle lobe of lung    2. Moderate asthma with acute exacerbation, unspecified whether persistent    3. Sepsis, due to unspecified organism, unspecified whether acute organ dysfunction present ([facility] [address]        DISPOSITION:  Admitted      CRITICAL CARE NOTE :    10:51 PM      IMPENDING DETERIORATION -Airway and Respiratory    ASSOCIATED RISK FACTORS - Hypotension, Hypoxia, Dysrhythmia and Metabolic changes    MANAGEMENT- Supervision of Care    INTERPRETATION -  Xrays, Blood Gases and ECG    INTERVENTIONS - hemodynamic mngmt and Metobolic interventions    TREATMENT RESPONSE -Stable    PERFORMED BY - Self        NOTES   :      I have spent 35 minutes of critical care time involved in lab review, consultations with specialist, family decision- making, bedside attention and documentation. During this entire length of time I was immediately available to the patient .     Maddie aYdav NP

## 2022-07-10 NOTE — H&P
[facility]  History and Physical    Patient ID:  Josse Villafana  female  1985  [id number]    Admission Date: 7/9/2022  Chief Complaint: Shortness of breath, pneumonia, asthma exacerbation  Reason for Admission: [facility] pneumonia, asthma exacerbation, hypokalemia    ASSESSMENT & PLAN:  HPI: 49-year-old female with past medical history of asthma  And prior history of pneumonia, recovered COVID-19 disease infection  Without hypoxia earlier this year January 2022. In May she did have a right upper lobe lung collapse, with follow-up x-ray showing resolution. She has been having worsening shortness of breath, and using her rescue inhaler  Frequently, she was seen at urgent care and she was started on prednisone and prescribed Advair however her symptoms persisted to the effect where she came to the emergency department for evaluation, her chest x-ray revealed right-sided pulmonary infiltrate, also low serum potassium level, elevated eosinophils, and venous blood gas with low oxygen levels, on exam she was noted to have diffuse audible wheezing, she was then admitted for asthma exacerbation with community-acquired pneumonia, and with hypoxia    Community-acquired pneumonia and asthma exacerbation  With acute hypoxemic respiratory failure  --Venous blood gas showing low venous oxygen levels, she has a history of asthma since childhood, asthma exacerbation provoked with hypoxia/low end of normal oxygen by right-sided pulmonary infiltrate/commune acquired pneumonia  -- Start IV azithromycin, IV ceftriaxone, Pulmicort, duo nebulizer treatments every 6 hours, and as needed, Mucinex, Acapella, Singulair 10 mg nightly, IV Solu-Medrol    Hypokalemia  - Resolved with potassium supplement, encourage oral intake    Disposition: Observation  Diet: Regular diet  VTE ppx: Early ambulation  GI ppx:  As needed  CODE STATUS: Full code  Surrogate decision-maker: Her Sister      No Known Allergies    None       Past Medical History:   Diagnosis Date    Asthma      History reviewed. No pertinent surgical history. Social History     Tobacco Use    Smoking status: Not on file    Smokeless tobacco: Not on file   Substance Use Topics    Alcohol use: Not on file       FAMILY HISTORY:    No known family history  REVIEW OF SYSTEMS:  A 14 point review of systems was taken and pertinent positive as per HPI.       PHYSICAL EXAM:    Visit Vitals  /72 (BP 1 Location: Left upper arm, BP Patient Position: At rest)   Pulse 92   Temp 97.8 °F (36.6 °C)   Resp 18   Ht 5' 6\" (1.676 m)   Wt 57.6 kg (127 lb)   SpO2 93%   BMI 20.50 kg/m²       General: No acute distress, speaking in full sentences, no use of accessory muscles   HEENT: Pupils equal and reactive to light and accommodation, oropharynx is clear   Neck: Supple, no lymphadenopathy, no JVD   Lungs: Diffuse audible and expiratory audible wheezing  Cardiovascular: Regular rate and rhythm with normal S1 and S2   Abdomen: Soft, nontender, nondistended, normoactive bowel sounds   Extremities: No cyanosis clubbing or edema   Neuro: Nonfocal, A&O x3   Psych: Normal mood and affect     Intake/Output last 3 shifts:  Date 07/09/22 0700 - 07/10/22 0659 07/10/22 0700 - 07/11/22 0659   Shift 4491-7689 1674-0910 24 Hour Total 1532-1915 6991-5459 24 Hour Total   INTAKE   I.V.  1300(1.9) 1300(0.9) 300  300     Volume (sodium chloride 0.9 % bolus infusion 1,000 mL)  1000 1000        Volume (azithromycin (ZITHROMAX) 500 mg in 0.9% sodium chloride 250 mL ([id number]))    250  250     Volume (cefTRIAXone (ROCEPHIN) 1 g in 0.9% sodium chloride (MBP/ADV) 50 mL MBP)    50  50     Volume (azithromycin (ZITHROMAX) 500 mg in 0.9% sodium chloride 250 mL ([id number]))  250 250        Volume (magnesium sulfate 2 g/50 ml IVPB (premix or compounded))  50 50      Shift Total(mL/kg)  1300(22.6) 1300(22.6) 300(5.2)  300(5.2)   OUTPUT   Shift Total(mL/kg)         NET  1300 1300 300  300   Weight (kg)  57.6 57.6 57.6 57.6 57.6         Labs:  CMP:   Lab Results   Component Value Date/Time     07/10/2022 10:23 AM    K 4.1 07/10/2022 10:23 AM     (H) 07/10/2022 10:23 AM    CO2 24 07/10/2022 10:23 AM    AGAP 7 07/10/2022 10:23 AM     (H) 07/10/2022 10:23 AM    BUN 9 07/10/2022 10:23 AM    CREA 0.62 07/10/2022 10:23 AM    GFRAA >60 07/10/2022 10:23 AM    GFRNA >60 07/10/2022 10:23 AM    CA 8.8 07/10/2022 10:23 AM    MG 2.9 (H) 07/10/2022 10:23 AM    PHOS 3.1 07/10/2022 10:23 AM    ALB 2.6 (L) 07/10/2022 10:23 AM    TBILI 0.3 07/10/2022 10:23 AM    TP 7.4 07/10/2022 10:23 AM    GLOB 4.8 (H) 07/10/2022 10:23 AM    AGRAT 0.5 (L) 07/10/2022 10:23 AM    ALT 13 07/10/2022 10:23 AM         CBC:    Lab Results   Component Value Date/Time    WBC 11.8 (H) 07/10/2022 10:23 AM    HGB 10.9 (L) 07/10/2022 10:23 AM    HCT 33.3 (L) 07/10/2022 10:23 AM     (H) 07/10/2022 10:23 AM       No results found for: INR, PTMR, PTP, PT1, PT2, INREXT    ABG:    Lab Results   Component Value Date/Time    PHI 7.39 07/10/2022 09:23 AM    PCO2I 40.0 07/10/2022 09:23 AM    PO2I 29 (LL) 07/10/2022 09:23 AM    HCO3I 24.4 07/10/2022 09:23 AM           No results found for: CPK, RCK1, RCK2, RCK3, RCK4, CKMB, CKNDX, CKND1, TROPT, TROIQ, BNPP, BNP      Imaging & Other Studies:    XR Results (maximum last 3): Results from Hospital Encounter encounter on 07/09/22    XR CHEST PA LAT    Narrative  EXAM: XR CHEST PA LAT    INDICATION: Shortness of breath    COMPARISON: Chest radiograph 4/10/2022, CTA chest 4/10/2022    TECHNIQUE: PA and lateral chest views    FINDINGS:  Cardiomediastinal silhouette within normal limits. Hazy consolidation in the  right midlung is concerning for pneumonia. Pleural spaces grossly clear. Impression  Hazy consolidation in the right midlung is concerning for pneumonia.       Results from [facility] [place] encounter on 04/10/22    XR CHEST PA LAT    Narrative  INDICATION:   4 months of coughing after covid-19    EXAM: PA and Lateral Chest Radiographs    COMPARISON: None    FINDINGS:    PA and lateral views of the chest demonstrate opacification of the right upper  lobe consistent with right upper lobe collapse. The left lung is clear. Cardiac  silhouette is normal. There is no pleural effusion. The osseous structures are  unremarkable. Impression  Right upper lobe collapse. CT Results (maximum last 3): Results from [facility] encounter on 04/10/22    CT CHEST W CONT    Narrative  INDICATION: Further evaluate RUL collapse    COMPARISON: Radiograph dated 4/10/2022    TECHNIQUE:  Following the uneventful intravenous administration of 100 cc  Isovue-300, 5 mm axial images were obtained through the chest. Coronal and  sagittal reformats were generated. CT dose reduction was achieved through use  of a standardized protocol tailored for this examination and automatic exposure  control for dose modulation. FINDINGS:    CHEST WALL: No mass or axillary lymphadenopathy. THYROID: No nodule. MEDIASTINUM: Enlarged mediastinal lymph nodes. Right paratracheal lymph node  measuring 11 mm. Precarinal lymph node measuring 11 mm. ISABEL: 10 mm right hilar lymph node  THORACIC AORTA: No dissection or aneurysm. MAIN PULMONARY ARTERY: Normal in caliber. TRACHEA/BRONCHI: The fluid or secretions old right upper lobe small airways. Mucous plugging in the left lower lobe bronchi. ESOPHAGUS: No wall thickening or dilatation. HEART: Normal in size. PLEURA: No effusion or pneumothorax. LUNGS: Centrilobular nodules in the bilateral lower lobes and perihilar  distribution. Complete collapse of the right upper lobe. Small peripheral  nodular opacities in the left apex as well as some groundglass opacities in the  bilateral lower lobes. INCIDENTALLY IMAGED UPPER ABDOMEN: Small hypodensities in the right kidney too  small to characterize  BONES: No destructive bone lesion. Impression  1.   Complete collapse of the right upper lobe.    2.  Fluid or secretion filled bronchi in the right upper lobe with mucus  plugging noted in the bilateral lower lobes. The collapse the right upper lobe  is likely related to mucous plugging. No discrete obstructing mass is  identified. 3.  Diffuse perihilar centrilobular nodules in the bilateral lower lobes which  may be secondary to aspiration or infectious bronchiolitis. In addition, small  nodular opacities are noted in the left apex. Consider infectious etiologies  including tuberculosis. MRI Results (maximum last 3): No results found for this or any previous visit. Nuclear Medicine Results (maximum last 3): No results found for this or any previous visit. US Results (maximum last 3): No results found for this or any previous visit. DEXA Results (maximum last 3): No results found for this or any previous visit. MACARENA Results (maximum last 3): No results found for this or any previous visit. IR Results (maximum last 3): No results found for this or any previous visit. VAS/US Results (maximum last 3): No results found for this or any previous visit. PET Results (maximum last 3): No results found for this or any previous visit.          EKG Results     Procedure 720 Value Units Date/Time    EKG, 12 LEAD, INITIAL [[id number]]     Order Status: Completed           Active Problems:  Patient Active Problem List    Diagnosis Date Noted    PNA (pneumonia) 07/10/2022         Cameron Strauss MD  [facility] Hospitalist Service  7/10/2022 6:00 PM

## 2022-07-10 NOTE — PROGRESS NOTES
Occupational Therapy  7/10/2022    Chart reviewed. Referral received. Pt admitted for PNA. Pt received in bed on breathing treatment. Introduced self and role of OT/PT. Pt reported she has been up, ambulating and performing her ADLs. Pt without any concerns and does not need a formal assessment. Will sign off.  Katalina Vora MS, OTR/L

## 2022-07-10 NOTE — PROGRESS NOTES
PT Note:    Orders received and acknowledged. Chart reviewed. Pt admitted for PNA. Pt received in bed on breathing treatment. Introduced self and role of OT/PT. Pt reported she has been up, ambulating and performing her ADLs. Patient on RA and O2 sats upper 90's. Patient without any concerns and does not need a formal assessment. Will sign off.

## 2022-07-10 NOTE — ED TRIAGE NOTES
Pt reports SOB started Sunday. [facility] and given prednisone Wednesday without relief. Pt reports using her inhaler without relief. Hx of asthma.

## 2022-07-10 NOTE — ED NOTES
Bedside, Verbal and Written shift change report given to [address]  (oncoming nurse) by Henrik Banda (offgoing nurse). Report included the following information SBAR, Kardex and ED Summary.

## 2022-07-11 LAB
BACTERIA SPEC CULT: NORMAL
BACTERIA SPEC CULT: NORMAL
SERVICE CMNT-IMP: NORMAL

## 2022-07-11 PROCEDURE — 94664 DEMO&/EVAL PT USE INHALER: CPT

## 2022-07-11 PROCEDURE — 74011000250 HC RX REV CODE- 250: Performed by: HOSPITALIST

## 2022-07-11 PROCEDURE — 94640 AIRWAY INHALATION TREATMENT: CPT

## 2022-07-11 PROCEDURE — 74011250636 HC RX REV CODE- 250/636: Performed by: HOSPITALIST

## 2022-07-11 PROCEDURE — 74011000258 HC RX REV CODE- 258: Performed by: STUDENT IN AN ORGANIZED HEALTH CARE EDUCATION/TRAINING PROGRAM

## 2022-07-11 PROCEDURE — 74011250636 HC RX REV CODE- 250/636: Performed by: STUDENT IN AN ORGANIZED HEALTH CARE EDUCATION/TRAINING PROGRAM

## 2022-07-11 PROCEDURE — 74011250637 HC RX REV CODE- 250/637: Performed by: HOSPITALIST

## 2022-07-11 PROCEDURE — 74011000250 HC RX REV CODE- 250: Performed by: STUDENT IN AN ORGANIZED HEALTH CARE EDUCATION/TRAINING PROGRAM

## 2022-07-11 PROCEDURE — 65270000029 HC RM PRIVATE

## 2022-07-11 PROCEDURE — G0378 HOSPITAL OBSERVATION PER HR: HCPCS

## 2022-07-11 RX ORDER — ALBUTEROL SULFATE 0.83 MG/ML
2.5 SOLUTION RESPIRATORY (INHALATION)
Status: ACTIVE | OUTPATIENT
Start: 2022-07-11 | End: 2022-07-11

## 2022-07-11 RX ORDER — IPRATROPIUM BROMIDE AND ALBUTEROL SULFATE 2.5; .5 MG/3ML; MG/3ML
3 SOLUTION RESPIRATORY (INHALATION)
Status: DISCONTINUED | OUTPATIENT
Start: 2022-07-11 | End: 2022-07-11

## 2022-07-11 RX ORDER — IPRATROPIUM BROMIDE AND ALBUTEROL SULFATE 2.5; .5 MG/3ML; MG/3ML
3 SOLUTION RESPIRATORY (INHALATION)
Status: DISCONTINUED | OUTPATIENT
Start: 2022-07-11 | End: 2022-07-12 | Stop reason: HOSPADM

## 2022-07-11 RX ADMIN — GUAIFENESIN 600 MG: 600 TABLET, EXTENDED RELEASE ORAL at 21:28

## 2022-07-11 RX ADMIN — IPRATROPIUM BROMIDE AND ALBUTEROL SULFATE 3 ML: .5; 3 SOLUTION RESPIRATORY (INHALATION) at 17:25

## 2022-07-11 RX ADMIN — IPRATROPIUM BROMIDE AND ALBUTEROL SULFATE 3 ML: .5; 3 SOLUTION RESPIRATORY (INHALATION) at 02:46

## 2022-07-11 RX ADMIN — CEFTRIAXONE SODIUM 1 G: 1 INJECTION, POWDER, FOR SOLUTION INTRAMUSCULAR; INTRAVENOUS at 09:06

## 2022-07-11 RX ADMIN — BUDESONIDE 250 MCG: 0.25 INHALANT RESPIRATORY (INHALATION) at 22:50

## 2022-07-11 RX ADMIN — AZITHROMYCIN MONOHYDRATE 500 MG: 500 INJECTION, POWDER, LYOPHILIZED, FOR SOLUTION INTRAVENOUS at 09:05

## 2022-07-11 RX ADMIN — METHYLPREDNISOLONE SODIUM SUCCINATE 40 MG: 40 INJECTION, POWDER, FOR SOLUTION INTRAMUSCULAR; INTRAVENOUS at 05:00

## 2022-07-11 RX ADMIN — METHYLPREDNISOLONE SODIUM SUCCINATE 40 MG: 40 INJECTION, POWDER, FOR SOLUTION INTRAMUSCULAR; INTRAVENOUS at 21:28

## 2022-07-11 RX ADMIN — MONTELUKAST 10 MG: 10 TABLET, FILM COATED ORAL at 21:27

## 2022-07-11 RX ADMIN — IPRATROPIUM BROMIDE AND ALBUTEROL SULFATE 3 ML: .5; 3 SOLUTION RESPIRATORY (INHALATION) at 08:39

## 2022-07-11 RX ADMIN — GUAIFENESIN 600 MG: 600 TABLET, EXTENDED RELEASE ORAL at 09:05

## 2022-07-11 RX ADMIN — Medication 1 CAPSULE: at 14:55

## 2022-07-11 RX ADMIN — SODIUM CHLORIDE, PRESERVATIVE FREE 10 ML: 5 INJECTION INTRAVENOUS at 21:28

## 2022-07-11 RX ADMIN — SODIUM CHLORIDE, PRESERVATIVE FREE 10 ML: 5 INJECTION INTRAVENOUS at 05:00

## 2022-07-11 RX ADMIN — METHYLPREDNISOLONE SODIUM SUCCINATE 40 MG: 40 INJECTION, POWDER, FOR SOLUTION INTRAMUSCULAR; INTRAVENOUS at 14:55

## 2022-07-11 RX ADMIN — SODIUM CHLORIDE, PRESERVATIVE FREE 10 ML: 5 INJECTION INTRAVENOUS at 14:56

## 2022-07-11 RX ADMIN — IPRATROPIUM BROMIDE AND ALBUTEROL SULFATE 3 ML: .5; 3 SOLUTION RESPIRATORY (INHALATION) at 22:50

## 2022-07-11 NOTE — PROGRESS NOTES
Colletta Cough provided to patient/representative with verbal explanation of the notice. Time allotted for questions regarding the notice. Patient /representative provided a completed copy of the VOON notice. Copy placed on bedside chart.   Nyla Bahena, Care Management Assistant

## 2022-07-11 NOTE — PROGRESS NOTES
Problem: Falls - Risk of  Goal: *Absence of Falls  Description: Document Michellenabila Jacey Fall Risk and appropriate interventions in the flowsheet.   Outcome: Progressing Towards Goal  Note: Fall Risk Interventions:

## 2022-07-11 NOTE — ED NOTES
Has a final transfer review been performed? Yes    Reason for Admission: community acquired pneumonia of right middle lobe  Patient comes from: home  Mental Status: Alert and oriented  ADL:self care  Ambulation:no difficulty  Pertinent Info/Safety Concerns: n/a  COVID Status: Negative  MEWS Score: 1  Vitals:    07/10/22 2133 07/10/22 2200 07/10/22 2206 07/10/22 2206   BP:  108/72  108/72   Pulse: 99 100 99 95   Resp: 20 17 15 15   Temp:    98.2 °F (36.8 °C)   SpO2: 91% 93% 95% 95%   Weight:       Height:         Transport mode:Wheelchair    TRANSFER - OUT REPORT:    Emilie Ortega  being transferred to (unit) for routine progression of care     \"Notification of etransfer note given to (Name) Alfredito Kendrick (Title) RN    Report consisted of patient's Situation, Background, Assessment and   Recommendations(SBAR). Lines:   Peripheral IV 07/09/22 Left Antecubital (Active)   Site Assessment Clean, dry, & intact 07/09/22 2137   Phlebitis Assessment 0 07/09/22 2137   Infiltration Assessment 0 07/09/22 2137   Dressing Status Clean, dry, & intact 07/09/22 2137   Dressing Type Transparent 07/09/22 2137   Hub Color/Line Status Pink;Patent; Flushed 07/09/22 2137   Action Taken Blood drawn 07/09/22 2137   Alcohol Cap Used No 07/09/22 2137       Peripheral IV 07/09/22 Right Antecubital (Active)   Site Assessment Clean, dry, & intact 07/09/22 2140   Phlebitis Assessment 0 07/09/22 2140   Infiltration Assessment 0 07/09/22 2140   Dressing Status Clean, dry, & intact 07/09/22 2140   Dressing Type Transparent 07/09/22 2140   Hub Color/Line Status Pink;Flushed;Patent 07/09/22 2140   Action Taken Blood drawn 07/09/22 2140   Alcohol Cap Used No 07/09/22 2140        Opportunity for questions and clarification was provided.

## 2022-07-11 NOTE — PROGRESS NOTES
KALANI:  1. RUR-5%  2. Return home with family when stable. Care Management Interventions  PCP Verified by CM: Yes  Palliative Care Criteria Met (RRAT>21 & CHF Dx)?: No  Mode of Transport at Discharge: Other (see comment)  MyChart Signup: No  Discharge Durable Medical Equipment: No  Health Maintenance Reviewed: Yes  Physical Therapy Consult: Yes  Occupational Therapy Consult: Yes  Speech Therapy Consult: No  Support Systems: Parent(s)  Confirm Follow Up Transport: Self  The Patient and/or Patient Representative was Provided with a Choice of Provider and Agrees with the Discharge Plan?: Yes   Resource Information Provided?: No  Discharge Location  Patient Expects to be Discharged to[de-identified] Home with family assistance    Reason for Admission:  Pneumonia                      RUR Score:          5%           Plan for utilizing home health:      No indication at this time. PCP: Patient has new pcp appointment for August 15th @ [facility] with Dr. Whitley Hare                    Current Advanced Directive/Advance Care Plan: Full Code      Healthcare Decision Maker:   Click here to complete [address] including selection of the Healthcare Decision Maker Relationship (ie \"Primary\")                             Transition of Care Plan:                      CM met with patient at bedside to explain role and offer support. Patient is alert and oriented, confirmed her demographics. She is independent with ADLs and IADLs, works full time.

## 2022-07-11 NOTE — PROGRESS NOTES
[facility] Progress Note    INTERVAL HISTORY  / Subjective:  Feels improved, still has shortness of breath, copious coughing but improved from prior to in patient  treatment     Assessment / Plan:  25-year-old female with past medical history of asthma  And prior history of pneumonia, recovered COVID-19 disease infection  Without hypoxia earlier this year January 2022. In May she did have a right upper lobe lung collapse, with follow-up x-ray showing resolution.   She has been having worsening shortness of breath, and using her rescue inhaler  Frequently, she was seen at urgent care and she was started on prednisone and prescribed Advair however her symptoms persisted to the effect where she came to the emergency department for evaluation, her chest x-ray revealed right-sided pulmonary infiltrate, also low serum potassium level, elevated eosinophils, and venous blood gas with low oxygen levels, on exam she was noted to have diffuse audible wheezing, she was then admitted for asthma exacerbation with community-acquired pneumonia, and with hypoxia     Community-acquired pneumonia and asthma exacerbation  With acute hypoxemic respiratory failure  --07/10 Venous blood gas showing low venous oxygen levels, she has a history of asthma since childhood, asthma exacerbation provoked with hypoxia/low end of normal oxygen by right-sided pulmonary infiltrate/commune acquired pneumonia she was Started IV azithromycin, IV ceftriaxone, Pulmicort, duo nebulizer treatments every 6 hours, and as needed, Mucinex, Acapella, Singulair 10 mg nightly, IV Solu-Medrol  --07/11 She still has diffuse audible wheezing and diminished breath sounds, she is not requiring oxygen, start 1 hour albuterol nebs, and resume IV azithromycin, IV ceftriaxone, Pulmicort, duo nebulizer treatments every 6 hours, and as needed, Mucinex, Acapella, Singulair 10 mg nightly, IV Solu-Medrol, reassess for treatment response     Discharge Disposition  --07/11 24-48 hours continued Asthma exacerbation and CAP treatment as above and await for continued improved treatment response       Objective:  Visit Vitals  /68 (BP 1 Location: Right upper arm, BP Patient Position: At rest)   Pulse 90   Temp 97.6 °F (36.4 °C)   Resp 16   Ht 5' 6\" (1.676 m)   Wt 57.6 kg (127 lb)   SpO2 95%   BMI 20.50 kg/m²                 Physical Exam:  General: No acute distress, speaking in full sentences, no use of accessory muscles   HEENT: Pupils equal and reactive to light and accommodation, oropharynx is clear   Neck: Supple, no lymphadenopathy, no JVD   Lungs: diffuse wheezing and diminished   Cardiovascular: Regular rate and rhythm with normal S1 and S2   Abdomen: Soft, nontender, nondistended, normoactive bowel sounds   Extremities: No cyanosis clubbing or edema   Neuro: Nonfocal, A&O x3   Psych: Normal affect     Intake and Output:  Date 07/10/22 0700 - 07/11/22 0659 07/11/22 0700 - 07/12/22 0659   Shift 2235-7823 7972-5993 24 Hour Total 7790-0983 2622-6124 24 Hour Total   INTAKE   I.V.(mL/kg/hr) 300(0.4)  300(0.2)        Volume (azithromycin (ZITHROMAX) 500 mg in 0.9% sodium chloride 250 mL ([id number])) 250  250        Volume (cefTRIAXone (ROCEPHIN) 1 g in 0.9% sodium chloride (MBP/ADV) 50 mL MBP) 50  50      Shift Total(mL/kg) 300(5.2)  300(5.2)      OUTPUT   Shift Total(mL/kg)           300      Weight (kg) 57.6 57.6 57.6 57.6 57.6 57.6       LAB:  Admission on 07/09/2022   Component Date Value    WBC 07/09/2022 11.5*    RBC 07/09/2022 4.01     HGB 07/09/2022 11.2*    HCT 07/09/2022 34.0*    MCV 07/09/2022 84.8     MCH 07/09/2022 27.9     MCHC 07/09/2022 32.9     RDW 07/09/2022 13.8     PLATELET 07/28/1144 374*    MPV 07/09/2022 9.2     NRBC 07/09/2022 0.0     ABSOLUTE NRBC 07/09/2022 0.00     NEUTROPHILS 07/09/2022 63     LYMPHOCYTES 07/09/2022 18     MONOCYTES 07/09/2022 9     EOSINOPHILS 07/09/2022 9*    BASOPHILS 07/09/2022 1     IMMATURE GRANULOCYTES 07/09/2022 0     ABS. NEUTROPHILS 07/09/2022 7.4     ABS. LYMPHOCYTES 07/09/2022 2.1     ABS. MONOCYTES 07/09/2022 1.0     ABS. EOSINOPHILS 07/09/2022 1.0*    ABS. BASOPHILS 07/09/2022 0.1     ABS. IMM. GRANS. 07/09/2022 0.0     DF 07/09/2022 AUTOMATED     Sodium 07/09/2022 140     Potassium 07/09/2022 2.8*    Chloride 07/09/2022 106     CO2 07/09/2022 28     Anion gap 07/09/2022 6     Glucose 07/09/2022 121*    BUN 07/09/2022 14     Creatinine 07/09/2022 1.07*    BUN/Creatinine ratio 07/09/2022 13     GFR est AA 07/09/2022 >60     GFR est non-AA 07/09/2022 57*    Calcium 07/09/2022 8.7     Bilirubin, total 07/09/2022 0.5     ALT (SGPT) 07/09/2022 19     AST (SGOT) 07/09/2022 10*    Alk. phosphatase 07/09/2022 54     Protein, total 07/09/2022 7.5     Albumin 07/09/2022 3.2*    Globulin 07/09/2022 4.3*    A-G Ratio 07/09/2022 0.7*    Special Requests: 07/09/2022 NO SPECIAL REQUESTS     Culture result: 07/09/2022 NO GROWTH 2 DAYS     Lactic acid 07/09/2022 2.5*    Ventricular Rate 07/09/2022 117     Atrial Rate 07/09/2022 117     P-R Interval 07/09/2022 124     QRS Duration 07/09/2022 74     Q-T Interval 07/09/2022 320     QTC Calculation (Bezet) 07/09/2022 446     Calculated P Axis 07/09/2022 74     Calculated R Axis 07/09/2022 71     Calculated T Axis 07/09/2022 45     Diagnosis 07/09/2022                      Value:Sinus tachycardia  Nonspecific ST abnormality  Otherwise normal ECG  No previous ECGs available  Confirmed by Jorge Sky ([id number]) on 7/10/2022 10:04:32 PM      Specimen source 07/09/2022 Nasopharyngeal     COVID-19 rapid test 07/09/2022 Not detected     SAMPLES BEING HELD 07/09/2022 1blu     COMMENT 07/09/2022 Add-on orders for these samples will be processed based on acceptable specimen integrity and analyte stability, which may vary by analyte.      Procalcitonin 07/09/2022 <0.05     Magnesium 07/09/2022 1.9     Lactic acid 07/10/2022 2.3*    WBC 07/10/2022 11.8*    RBC 07/10/2022 3.93     HGB 07/10/2022 10.9*    HCT 07/10/2022 33.3*    MCV 07/10/2022 84.7     MCH 07/10/2022 27.7     MCHC 07/10/2022 32.7     RDW 07/10/2022 14.0     PLATELET 44/87/8534 266*    MPV 07/10/2022 9.3     NRBC 07/10/2022 0.0     ABSOLUTE NRBC 07/10/2022 0.00     Sodium 07/10/2022 140     Potassium 07/10/2022 4.1     Chloride 07/10/2022 109*    CO2 07/10/2022 24     Anion gap 07/10/2022 7     Glucose 07/10/2022 159*    BUN 07/10/2022 9     Creatinine 07/10/2022 0.62     BUN/Creatinine ratio 07/10/2022 15     GFR est AA 07/10/2022 >60     GFR est non-AA 07/10/2022 >60     Calcium 07/10/2022 8.8     Bilirubin, total 07/10/2022 0.3     ALT (SGPT) 07/10/2022 13     AST (SGOT) 07/10/2022 11*    Alk.  phosphatase 07/10/2022 50     Protein, total 07/10/2022 7.4     Albumin 07/10/2022 2.6*    Globulin 07/10/2022 4.8*    A-G Ratio 07/10/2022 0.5*    Procalcitonin 07/10/2022 <0.05     Color 07/10/2022 YELLOW/STRAW     Appearance 07/10/2022 CLEAR     Specific gravity 07/10/2022 1.023     pH (UA) 07/10/2022 6.5     Protein 07/10/2022 Negative     Glucose 07/10/2022 500*    Ketone 07/10/2022 Negative     Bilirubin 07/10/2022 Negative     Blood 07/10/2022 SMALL*    Urobilinogen 07/10/2022 0.2     Nitrites 07/10/2022 Negative     Leukocyte Esterase 07/10/2022 Negative     WBC 07/10/2022 0-4     RBC 07/10/2022 0-5     Epithelial cells 07/10/2022 FEW     Bacteria 07/10/2022 Negative     Hyaline cast 07/10/2022 0-2     pH (POC) 07/10/2022 7.40     pCO2 (POC) 07/10/2022 40.2     pO2 (POC) 07/10/2022 75*    HCO3 (POC) 07/10/2022 24.7     sO2 (POC) 07/10/2022 94.8     Base deficit (POC) 07/10/2022 0.1     Site 07/10/2022 RIGHT RADIAL     Device: 07/10/2022 ROOM AIR     Allens test (POC) 07/10/2022 Positive     Specimen type (POC) 07/10/2022 ARTERIAL     pH (POC) 07/10/2022 7.39     pCO2 (POC) 07/10/2022 40.0     pO2 (POC) 07/10/2022 29*    HCO3 (POC) 07/10/2022 24.4     sO2 (POC) 07/10/2022 55.2*    Base deficit (POC) 07/10/2022 0.5     Specimen type (POC) 07/10/2022 VENOUS BLOOD     Performed by 07/10/2022 Loann Heal Delayna     Lactic Acid (POC) 07/10/2022 1.07     Magnesium 07/10/2022 2.9*    Phosphorus 07/10/2022 3.1     Urine culture hold 07/10/2022 Urine on hold in Microbiology dept for 2 days. If unpreserved urine is submitted, it cannot be used for addtional testing after 24 hours, recollection will be required. IMAGING:  XR CHEST PA LAT    Result Date: 7/9/2022  Hazy consolidation in the right midlung is concerning for pneumonia. EKG:  No results found for this or any previous visit.           Maria Luz Tejeda MD  7/11/2022 12:44 PM

## 2022-07-12 VITALS
BODY MASS INDEX: 20.7 KG/M2 | HEART RATE: 101 BPM | WEIGHT: 128.8 LBS | OXYGEN SATURATION: 95 % | DIASTOLIC BLOOD PRESSURE: 72 MMHG | RESPIRATION RATE: 16 BRPM | TEMPERATURE: 98 F | HEIGHT: 66 IN | SYSTOLIC BLOOD PRESSURE: 116 MMHG

## 2022-07-12 PROCEDURE — 74011000250 HC RX REV CODE- 250: Performed by: HOSPITALIST

## 2022-07-12 PROCEDURE — 94640 AIRWAY INHALATION TREATMENT: CPT

## 2022-07-12 PROCEDURE — 74011000258 HC RX REV CODE- 258: Performed by: STUDENT IN AN ORGANIZED HEALTH CARE EDUCATION/TRAINING PROGRAM

## 2022-07-12 PROCEDURE — 74011250636 HC RX REV CODE- 250/636: Performed by: STUDENT IN AN ORGANIZED HEALTH CARE EDUCATION/TRAINING PROGRAM

## 2022-07-12 PROCEDURE — 74011250637 HC RX REV CODE- 250/637: Performed by: HOSPITALIST

## 2022-07-12 PROCEDURE — 74011250636 HC RX REV CODE- 250/636: Performed by: HOSPITALIST

## 2022-07-12 RX ORDER — PEN NEEDLE, DIABETIC 31 GX5/16"
NEEDLE, DISPOSABLE MISCELLANEOUS
Qty: 1 EACH | Refills: 0 | Status: SHIPPED | OUTPATIENT
Start: 2022-07-12

## 2022-07-12 RX ORDER — DOXYCYCLINE 100 MG/1
100 TABLET ORAL 2 TIMES DAILY
Qty: 8 TABLET | Refills: 0 | Status: SHIPPED | OUTPATIENT
Start: 2022-07-12 | End: 2022-07-16

## 2022-07-12 RX ORDER — IPRATROPIUM BROMIDE AND ALBUTEROL SULFATE 2.5; .5 MG/3ML; MG/3ML
3 SOLUTION RESPIRATORY (INHALATION)
Qty: 30 NEBULE | Refills: 0 | Status: SHIPPED | OUTPATIENT
Start: 2022-07-12

## 2022-07-12 RX ORDER — AMOXICILLIN AND CLAVULANATE POTASSIUM 875; 125 MG/1; MG/1
1 TABLET, FILM COATED ORAL 2 TIMES DAILY
Qty: 8 TABLET | Refills: 0 | Status: SHIPPED | OUTPATIENT
Start: 2022-07-12 | End: 2022-07-16

## 2022-07-12 RX ORDER — ALBUTEROL SULFATE 90 UG/1
1 AEROSOL, METERED RESPIRATORY (INHALATION)
Qty: 18 G | Refills: 0 | Status: SHIPPED | OUTPATIENT
Start: 2022-07-12

## 2022-07-12 RX ORDER — PREDNISONE 50 MG/1
50 TABLET ORAL DAILY
Qty: 3 TABLET | Refills: 0 | Status: SHIPPED | OUTPATIENT
Start: 2022-07-12 | End: 2022-07-15

## 2022-07-12 RX ORDER — MONTELUKAST SODIUM 10 MG/1
10 TABLET ORAL
Qty: 30 TABLET | Refills: 0 | Status: SHIPPED | OUTPATIENT
Start: 2022-07-12

## 2022-07-12 RX ORDER — GUAIFENESIN 600 MG/1
600 TABLET, EXTENDED RELEASE ORAL EVERY 12 HOURS
Qty: 30 TABLET | Refills: 0 | Status: SHIPPED | OUTPATIENT
Start: 2022-07-12

## 2022-07-12 RX ADMIN — POLYETHYLENE GLYCOL 3350 17 G: 17 POWDER, FOR SOLUTION ORAL at 09:03

## 2022-07-12 RX ADMIN — GUAIFENESIN 600 MG: 600 TABLET, EXTENDED RELEASE ORAL at 09:04

## 2022-07-12 RX ADMIN — BUDESONIDE 250 MCG: 0.25 INHALANT RESPIRATORY (INHALATION) at 09:50

## 2022-07-12 RX ADMIN — AZITHROMYCIN MONOHYDRATE 500 MG: 500 INJECTION, POWDER, LYOPHILIZED, FOR SOLUTION INTRAVENOUS at 09:05

## 2022-07-12 RX ADMIN — IPRATROPIUM BROMIDE AND ALBUTEROL SULFATE 3 ML: .5; 3 SOLUTION RESPIRATORY (INHALATION) at 09:50

## 2022-07-12 RX ADMIN — Medication 1 CAPSULE: at 09:04

## 2022-07-12 RX ADMIN — METHYLPREDNISOLONE SODIUM SUCCINATE 40 MG: 40 INJECTION, POWDER, FOR SOLUTION INTRAMUSCULAR; INTRAVENOUS at 06:40

## 2022-07-12 RX ADMIN — SODIUM CHLORIDE, PRESERVATIVE FREE 10 ML: 5 INJECTION INTRAVENOUS at 06:40

## 2022-07-12 RX ADMIN — CEFTRIAXONE SODIUM 1 G: 1 INJECTION, POWDER, FOR SOLUTION INTRAMUSCULAR; INTRAVENOUS at 10:27

## 2022-07-12 NOTE — PROGRESS NOTES
7/12/2022            RE: Narcisa [phone]              To Whom It May Concern,      Due to medical reasons, Roc Gaspar may  return to work on 07/16/2022.         Sincerely,          Vianey Victoria MD

## 2022-07-12 NOTE — PROGRESS NOTES
Problem: Falls - Risk of  Goal: *Absence of Falls  Description: Document Maya Zacarias Fall Risk and appropriate interventions in the flowsheet.   Outcome: Resolved/Met     Problem: Patient Education: Go to Patient Education Activity  Goal: Patient/Family Education  Outcome: Resolved/Met     Problem: Breathing Pattern - Ineffective  Goal: *Absence of hypoxia  Outcome: Resolved/Met

## 2022-07-12 NOTE — PROGRESS NOTES
I have reviewed discharge instructions with the patient. Questions and concerned addressed. Prescriptions and work note given to patient. The patient verbalized understanding. IV access removed. Patient's signature on chart for discharge paperwork. 1445: Patient discharged via private vehicle.

## 2022-07-12 NOTE — DISCHARGE SUMMARY
[facility] Discharge Summary    Patient ID:  Karen Trotter female. 1985.  [id number]    Admit date: 7/9/2022  9:41 PM    Discharge date: 07/12/22     Admitting Physician: Soo Rosenberg MD  Attending Physician: Thor Gold MD  Primary Care Physician: None     Discharge Physician: Soo Rosenberg MD    Discharged Condition: Stable    Indication for Admission:   Chief Complaint   Patient presents with    Shortness of Breath        Reason for hospitalization:   Patient Active Problem List   Diagnosis Code    PNA (pneumonia) J18.9    CAP (community acquired pneumonia) J18.9    Asthma exacerbation J45.901       Discharge Diagnosis: Right-sided community-acquired pneumonia, asthma exacerbation  Discharge Disposition: Stable for discharge to home  Follow up Instructions: Establish care with primary care physician, establish pulmonology referral  Did Patient have Sepsis (YES OR NO): No sepsis    Hospital Course:   77-year-old female with past medical history of asthma  And prior history of pneumonia, recovered COVID-19 disease infection  Without hypoxia earlier this year January 2022. In May she did have a right upper lobe lung collapse, with follow-up x-ray showing resolution. She has been having worsening shortness of breath, and using her rescue inhaler  Frequently, she was seen at urgent care and she was started on prednisone and prescribed Advair however her symptoms persisted to the effect where she came to the emergency department for evaluation, her chest x-ray revealed right-sided pulmonary infiltrate, also low serum potassium level, elevated eosinophils, and venous blood gas with low oxygen levels, on exam she was noted to have diffuse audible wheezing, she was then admitted for asthma exacerbation with community-acquired pneumonia, and with hypoxia.   She is admitted and managed for acute hypoxemic respiratory failure due to community-acquired pneumonia and asthma exacerbation, she started on intravenous antibiotics with IV vancomycin, IV ceftriaxone, and started on around-the-clock nebulizer treatments and bronchodilators, Singulair, systemic steroids, and mucolytic's. Overall condition improved, she was discharged with a prescription for nebulizer, nebulizer accessories, nebulizer solution, albuterol MDI inhaler, oral prednisone, antibiotics Augmentin/doxycycline. Discharge Exam:  Visit Vitals  /72 (BP 1 Location: Right arm, BP Patient Position: At rest)   Pulse (!) 101   Temp 98 °F (36.7 °C)   Resp 16   Ht 5' 6\" (1.676 m)   Wt 58.4 kg (128 lb 12.8 oz)   SpO2 95%   BMI 20.79 kg/m²      General: No acute distress, speaking in full sentences, no use of accessory muscles   HEENT: Pupils equal and reactive to light and accommodation, oropharynx is clear   Neck: Supple, no lymphadenopathy, no JVD   Lungs: Bronchial breath sounds some faint wheezing, overall improved air entry   cardiovascular: Regular rate and rhythm with normal S1 and S2   Abdomen: Soft, nontender, nondistended, normoactive bowel sounds   Extremities: No cyanosis clubbing or edema   Neuro: Nonfocal, A&O x3   Psych: Normal affect     Consults: None    Code Status: Full Code    Significant Diagnostic Studies:   CMP: No results found for: NA, K, CL, CO2, AGAP, GLU, BUN, CREA, GFRAA, GFRNA, CA, MG, PHOS, ALB, TBIL, TBILI, TP, ALB, GLOB, AGRAT, ALT      CBC:  No results found for: WBC, HGB, HCT, PLT, HGBEXT, HCTEXT, PLTEXT    No results found for: INR, PTMR, PTP, PT1, PT2, INREXT    ABG:  No results found for: PH, PHI, PCO2, PCO2I, PO2, PO2I, HCO3, HCO3I, FIO2, FIO2I        No results found for: CPK, RCK1, RCK2, RCK3, RCK4, CKMB, CKNDX, CKND1, TROPT, TROIQ, BNPP, BNP        Radiology Reports :   [unfilled]      Discharge Medications:  Current Discharge Medication List      START taking these medications    Details   guaiFENesin ER (MUCINEX) 600 mg ER tablet Take 1 Tablet by mouth every twelve (12) hours.   Qty: 30 Tablet, Refills: 0      L.acid,para-B. bifidum-S.therm (RISAQUAD) 8 billion cell cap cap Take 1 Capsule by mouth daily. Qty: 30 Capsule, Refills: 0      montelukast (SINGULAIR) 10 mg tablet Take 1 Tablet by mouth nightly. Qty: 30 Tablet, Refills: 0      albuterol-ipratropium (DUO-NEB) 2.5 mg-0.5 mg/3 ml nebu 3 mL by Nebulization route every two (2) hours as needed for Wheezing, Shortness of Breath, Respiratory Distress or Cough. Qty: 30 Nebule, Refills: 0      albuterol (PROVENTIL HFA, VENTOLIN HFA, PROAIR HFA) 90 mcg/actuation inhaler Take 1 Puff by inhalation every four (4) hours as needed for Wheezing or Shortness of Breath. Qty: 18 g, Refills: 0      Nebulizers misc Nebulizer Machine and Accessories  ( Diagnosis Asthma )  Qty: 1 Each, Refills: 0      doxycycline (ADOXA) 100 mg tablet Take 1 Tablet by mouth two (2) times a day for 4 days. Qty: 8 Tablet, Refills: 0      amoxicillin-clavulanate (Augmentin) 875-125 mg per tablet Take 1 Tablet by mouth two (2) times a day for 4 days. Qty: 8 Tablet, Refills: 0         CONTINUE these medications which have CHANGED    Details   predniSONE (DELTASONE) 50 mg tablet Take 1 Tablet by mouth daily for 3 days. Qty: 3 Tablet, Refills: 0         CONTINUE these medications which have NOT CHANGED    Details   fluticasone propion-salmeteroL (ADVAIR/WIXELA) 250-50 mcg/dose diskus inhaler Take 1 Puff by inhalation every twelve (12) hours.          STOP taking these medications       albuterol (PROVENTIL VENTOLIN) 2.5 mg /3 mL (0.083 %) nebu Comments:   Reason for Stopping:               Medications Discontinued during this hospitalization:   Medications Discontinued During This Encounter   Medication Reason    famotidine (PF) (PEPCID) 20 mg in 0.9% sodium chloride 10 mL injection     guaiFENesin ER (MUCINEX) tablet 600 mg     lactated Ringers infusion     budesonide (PULMICORT) 250 mcg/2ml nebulizer susp     albuterol-ipratropium (DUO-NEB) 2.5 MG-0.5 MG/3 ML     albuterol-ipratropium (DUO-NEB) 2.5 MG-0.5 MG/3 ML        Patient Instructions: Activity: as tolerated. Diet:   ADULT DIET Regular    Therapy Ordered:  No therapy plan of the specified type found. Follow-up appointments: Follow-up Appointments   Procedures    FOLLOW UP VISIT Appointment in: One Week Primary Care     Primary Care     Standing Status:   Standing     Number of Occurrences:   1     Order Specific Question:   Appointment in     Answer: One Week       Time Spent on Discharge greater than 30 minutes.     Jermaine Evans MD  7/12/2022 1:56 PM

## 2022-07-12 NOTE — PROGRESS NOTES
I have read over & agree with the nurse extern's documentation. Nurse extern was appropriately supervised during medication administration & skill performance by preceptor.     Sandra Webster RN

## 2022-07-12 NOTE — PROGRESS NOTES
Nursing Care Plan    Problem: Falls - Risk of  Goal: *Absence of Falls  Description: Document Michael Fuller Fall Risk and appropriate interventions in the flowsheet.   Outcome: Progressing Towards Goal  Note: Fall Risk Interventions:      Problem: Patient Education: Go to Patient Education Activity  Goal: Patient/Family Education  Outcome: Progressing Towards Goal     Problem: Breathing Pattern - Ineffective  Goal: *Absence of hypoxia  Outcome: Progressing Towards Goal

## 2022-07-12 NOTE — DISCHARGE INSTRUCTIONS
Patient Education     Asthma Action Plan: After Your Visit  Your Care Instructions  An asthma action plan is based on peak flow and asthma symptoms. Sorting symptoms and peak flow into red, yellow, and green \"zones\" can help you know how bad your asthma is and what actions you should take. Work with your doctor to make your plan. An action plan may include:  · The peak flow readings and symptoms for each zone. · What medicines to take in each zone. · When to call a doctor. · A list of emergency contact numbers. · A list of your asthma triggers. Follow-up care is a key part of your treatment and safety. Be sure to make and go to all appointments, and call your doctor if you are having problems. It's also a good idea to know your test results and keep a list of the medicines you take. How can you care for yourself at home? · Take your daily medicines to help minimize long-term damage and avoid asthma attacks. · Check your peak flow every morning and evening. This is the best way to know how well your lungs are working. · Check your action plan to see what zone you are in.  ¨ If you are in the green zone, keep taking your daily asthma medicines as prescribed. ¨ If you are in the yellow zone, you may be having or will soon have an asthma attack. You may not have any symptoms, but your lungs are not working as well as they should. Take the medicines listed in your action plan. If you stay in the yellow zone, your doctor may need to increase the dose or add a medicine. ¨ If you are in the red zone, follow your action plan. If your symptoms or peak flow don't improve soon, you may need to go to the emergency room or be admitted to the hospital.  · Use an asthma diary. Write down your peak flow readings in the asthma diary. If you have an attack, write down what caused it (if you know), the symptoms, and what medicine you took.   · Make sure you know how and when to call your doctor or go to the hospital.  · Take both the asthma action plan and the asthma diary--along with your peak flow meter and medicines--when you see your doctor. Tell your doctor if you are having trouble following your action plan. When should you call for help? Call 911 anytime you think you may need emergency care. For example, call if:  · You have severe trouble breathing. Call your doctor now or seek immediate medical care if:  · Your symptoms do not get better after you have followed your asthma action plan. · You cough up yellow, dark brown, or bloody mucus (sputum). Watch closely for changes in your health, and be sure to contact your doctor if:  · Your coughing and wheezing get worse. · You need to use quick-relief medicine on more than 2 days a week (unless it is just for exercise). · You need help figuring out what is triggering your asthma attacks. Where can you learn more? Go to [employer]  Enter B511 in the search box to learn more about \"Asthma Action Plan: After Your Visit. \"   © 0395-7892 Healthwise, Incorporated. Care instructions adapted under license by [employer] (which disclaims liability or warranty for this information). This care instruction is for use with your licensed healthcare professional. If you have questions about a medical condition or this instruction, always ask your healthcare professional. [address] any warranty or liability for your use of this information. Content Version: 82.8.564612; Last Revised: March 9, 2012                 Patient Education     Learning About Asthma Triggers  What are triggers? When you have asthma, certain things can make your symptoms worse. These are called triggers. They include:  · Cigarette smoke or air pollution. · Things you are allergic to, such as:  ¨ Pollen, mold, or dust mites. ¨ Pet hair, skin, or saliva. · Illnesses, like colds, flu, or pneumonia. · Exercise. · Dry, cold air.   How do triggers affect asthma? Triggers can make it harder for your lungs to work as they should and can lead to sudden difficulty breathing and other symptoms. When you are around a trigger, an asthma attack is more likely. If your symptoms are severe, you may need emergency treatment or have to go to the hospital for treatment. If you know what your triggers are and can avoid them, you may be able to prevent asthma attacks, reduce how often you have them, and make them less severe. What can you do to avoid triggers? The first thing is to know your triggers. When you are having symptoms, note the things around you that might be causing them. Then look for patterns in what may be triggering your symptoms. Record your triggers on a piece of paper or in an asthma diary. When you have your list of possible triggers, work with your doctor to find ways to avoid them. You also can check how well your lungs are working by measuring your peak expiratory flow (PEF) throughout the day. Your PEF may drop when you are near things that trigger symptoms. Here are some ways to avoid a few common triggers. · Do not smoke or allow others to smoke around you. If you need help quitting, talk to your doctor about stop-smoking programs and medicines. These can increase your chances of quitting for good. · If there is a lot of pollution, pollen, or dust outside, stay at home and keep your windows closed. Use an air conditioner or air filter in your home. Check your local weather report or newspaper for air quality and pollen reports. · Get a flu shot every year. Talk to your doctor about getting a pneumococcal shot. Wash your hands often to prevent infections. · Avoid exercising outdoors in cold weather. If you are outdoors in cold weather, wear a scarf around your face and breathe through your nose. How can you manage an asthma attack? · If you have an asthma action plan, follow the plan.  In general:  ¨ Use your quick-relief inhaler as directed by your doctor. If your symptoms do not get better after you use your medicine, have someone take you to the emergency room. Call an ambulance if needed. ¨ If your doctor has given you other inhaled medicines or steroid pills, take them as directed. Where can you learn more? Go to [employer].be  Enter M564 in the search box to learn more about \"Learning About Asthma Triggers. \"   © 5829-6556 Healthwise, Incorporated. Care instructions adapted under license by [employer] (which disclaims liability or warranty for this information). This care instruction is for use with your licensed healthcare professional. If you have questions about a medical condition or this instruction, always ask your healthcare professional. [place] [address] any warranty or liability for your use of this information. Content Version: 78.1.230611;  Last Revised: February 23, 2012

## 2022-07-12 NOTE — PROGRESS NOTES
Physician Progress Note      Nirmala Benitez  CSN #:                  [id number]  :                       1985  ADMIT DATE:       2022 9:41 PM  [address] [place] DATE:  RESPONDING  PROVIDER #:        Mark Helm MD          QUERY TEXT:    Patient admitted with pna, asthma exacerbation. Noted documentation of acute hypoxic respiratory failure starting in H&P in a pt on RA >91%. In order to support the diagnosis of acute hypoxic respiratory failure, please include additional clinical indicators in your documentation. Or please document if the diagnosis of acute respiratory failure has been ruled out after further study. The medical record reflects the following:  Risk Factors: asthma exac, pna    Clinical Indicators: ABGs pH 7.40, pCO2 40.2, pO2 75 on RA  RA >91% with RR 11 - 26    Pulmonary:  Effort: Tachypnea, accessory muscle usage and prolonged expiration present. Breath sounds: Decreased breath sounds and wheezing present.  No rales    H&P-  Community-acquired pneumonia and asthma exacerbation  With acute hypoxemic respiratory failure  --Venous blood gas showing low venous oxygen levels, she has a history of asthma since childhood, asthma exacerbation provoked with hypoxia/low end of normal oxygen by right-sided pulmonary infiltrate/commune acquired pneumonia    Treatment:  Azithromycin 500mg IV daily, Ceftriaxone 1g IV daily, Pulmicort 250mcg neb BID, Solu-Medrol 40mg IV Q 8hrs    Acute Respiratory Failure Clinical Indicators per [employer] Training Guide and Quick Reference Guide:  pO2 < 60 mmHg or SpO2 (pulse oximetry) < 91% breathing room air  pCO2 > 50 and pH < 7.35  P/F ratio (pO2 / FIO2) < 300  pO2 decrease or pCO2 increase by 10 mmHg from baseline (if known)  Supplemental oxygen of 40% or more  Presence of respiratory distress, tachypnea, dyspnea, shortness of breath, wheezing  Unable to speak in complete sentences  Use of accessory muscles to breathe  Extreme anxiety and feeling of impending doom  Tripod position  Confusion/altered mental status/obtunded    Thank you,  Jimbo Salmon RN, BSN, CRCR, CCDS, SMART  Clinical Documentation Improvement  [phone] or via Perfect Serve  Options provided:  -- Acute Respiratory Failure as evidenced by, Please document evidence.   -- Acute Respiratory Failure ruled out after study  -- Other - I will add my own diagnosis  -- Disagree - Not applicable / Not valid  -- Disagree - Clinically unable to determine / Unknown  -- Refer to Clinical Documentation Reviewer    PROVIDER RESPONSE TEXT:    This patient is in acute respiratory failure as evidenced by Venous PO2 29    Query created by: Luca Molina on 7/12/2022 11:14 AM      Electronically signed by:  Allen David MD 7/12/2022 1:25 PM

## 2022-07-14 LAB
BACTERIA SPEC CULT: NORMAL
SERVICE CMNT-IMP: NORMAL

## 2022-08-17 ENCOUNTER — OFFICE VISIT (OUTPATIENT)
Dept: FAMILY MEDICINE CLINIC | Age: 37
End: 2022-08-17
Payer: COMMERCIAL

## 2022-08-17 VITALS
RESPIRATION RATE: 16 BRPM | WEIGHT: 132.2 LBS | BODY MASS INDEX: 22.02 KG/M2 | OXYGEN SATURATION: 96 % | SYSTOLIC BLOOD PRESSURE: 108 MMHG | HEIGHT: 65 IN | TEMPERATURE: 96.9 F | HEART RATE: 89 BPM | DIASTOLIC BLOOD PRESSURE: 69 MMHG

## 2022-08-17 DIAGNOSIS — J41.0 SIMPLE CHRONIC BRONCHITIS (HCC): Primary | ICD-10-CM

## 2022-08-17 DIAGNOSIS — Z71.89 ADVICE GIVEN ABOUT COVID-19 VIRUS INFECTION: ICD-10-CM

## 2022-08-17 PROCEDURE — 99203 OFFICE O/P NEW LOW 30 MIN: CPT | Performed by: FAMILY MEDICINE

## 2022-08-17 RX ORDER — FLUTICASONE PROPIONATE AND SALMETEROL 100; 50 UG/1; UG/1
1 POWDER RESPIRATORY (INHALATION) EVERY 12 HOURS
Qty: 60 EACH | Refills: 3 | Status: SHIPPED | OUTPATIENT
Start: 2022-08-17

## 2022-08-17 RX ORDER — DOXYCYCLINE 100 MG/1
100 CAPSULE ORAL 2 TIMES DAILY
Qty: 20 CAPSULE | Refills: 0 | Status: SHIPPED | OUTPATIENT
Start: 2022-08-17 | End: 2022-08-27

## 2022-08-17 RX ORDER — METHYLPREDNISOLONE 4 MG/1
TABLET ORAL
Qty: 1 DOSE PACK | Refills: 0 | Status: SHIPPED | OUTPATIENT
Start: 2022-08-17

## 2022-08-17 NOTE — PROGRESS NOTES
Chief Complaint   Patient presents with    Establish Care     1. Have you been to the ER, urgent care clinic since your last visit? Hospitalized since your last visit? Yes When: 7/10/22 Where: [facility]  Reason for visit: pneumonia     2. Have you seen or consulted any other health care providers outside of the [address] [place] [address] since your last visit? Include any pap smears or colon screening. No    Health Maintenance   Topic Date Due    Hepatitis C Screening  Never done    Depression Screen  Never done    COVID-19 Vaccine (1) Never done    Pneumococcal 0-64 years (1 - PCV) Never done    DTaP/Tdap/Td series (1 - Tdap) Never done    Cervical cancer screen  Never done    Flu Vaccine (1) 09/01/2022     Verified patient with two type of identifiers.   here to establish care- no previous pcp , denies c/o at present

## 2022-08-17 NOTE — PROGRESS NOTES
xycHISTORY OF PRESENT ILLNESS  Conception Alfred is a 42 y.o. female,  A Covid unvaccinated x3, and nicely masked Denies flu like sxs, with current medical history of childhood  bronchitis, present with the following concern for f/u  with the +covid-19 Jan of 2022, no trouble in 2021, been healthy so far, was told to have Asthma, but never beent ested,   The patient has had frequent daytime and nighttime asthma symptoms, patient currently has been taking the short-acting bronchodilator few times a day over the last couple weeks,  The patient is using short-acting beta agonists for symptom control , fortunately patient has no exacerbation over the last 12 months,   hasnot  been vaccinated with Tdap and pneumonia vaccine, patient currently has + wheezing, some dry cough, no sob,   there has not been few night awakening for dyspnea over the last 30 days,  + RF needed,     Recently was hospitalized for the Right-sided community-acquired pneumonia, and she  Missed work July 10th to July 16th      Current Outpatient Medications   Medication Sig Dispense Refill    albuterol (PROVENTIL HFA, VENTOLIN HFA, PROAIR HFA) 90 mcg/actuation inhaler Take 1 Puff by inhalation every four (4) hours as needed for Wheezing or Shortness of Breath. 18 g 0    guaiFENesin ER (MUCINEX) 600 mg ER tablet Take 1 Tablet by mouth every twelve (12) hours. (Patient not taking: Reported on 8/17/2022) 30 Tablet 0    L.acid,para-B. bifidum-S.therm (RISAQUAD) 8 billion cell cap cap Take 1 Capsule by mouth daily. (Patient not taking: Reported on 8/17/2022) 30 Capsule 0    montelukast (SINGULAIR) 10 mg tablet Take 1 Tablet by mouth nightly. (Patient not taking: Reported on 8/17/2022) 30 Tablet 0    albuterol-ipratropium (DUO-NEB) 2.5 mg-0.5 mg/3 ml nebu 3 mL by Nebulization route every two (2) hours as needed for Wheezing, Shortness of Breath, Respiratory Distress or Cough.  (Patient not taking: Reported on 8/17/2022) 30 Nebule 0    Nebulizers [facility] Nebulizer Machine and Accessories  ( Diagnosis Asthma ) (Patient not taking: Reported on 8/17/2022) 1 Each 0    fluticasone propion-salmeteroL (ADVAIR/WIXELA) 250-50 mcg/dose diskus inhaler Take 1 Puff by inhalation every twelve (12) hours. (Patient not taking: Reported on 8/17/2022)       No Known Allergies  Past Medical History:   Diagnosis Date    Asthma      No past surgical history on file. Family History   Problem Relation Age of Onset    Depression Mother     Anxiety Mother     No Known Problems Father      Social History     Tobacco Use    Smoking status: Never     Passive exposure: Never    Smokeless tobacco: Never   Substance Use Topics    Alcohol use: Yes     Comment: social      Lab Results   Component Value Date/Time    Glucose 159 (H) 07/10/2022 10:23 AM    Creatinine 0.62 07/10/2022 10:23 AM      No results found for: CHOL, CHOLPOCT, HDL, LDL, LDLC, LDLCPOC, LDLCEXT, TRIGL, TGLPOCT, CHHD, CHHDX     Review of Systems   Constitutional:  Negative for chills and fever. HENT:  Negative for congestion and nosebleeds. Eyes:  Negative for blurred vision and pain. Respiratory:  Positive for cough, sputum production and wheezing. Negative for shortness of breath. Cardiovascular:  Negative for chest pain and leg swelling. Gastrointestinal:  Negative for constipation, diarrhea, nausea and vomiting. Genitourinary:  Negative for dysuria and frequency. Musculoskeletal:  Negative for joint pain and myalgias. Skin:  Negative for itching and rash. Neurological:  Negative for dizziness, loss of consciousness and headaches. Psychiatric/Behavioral:  Negative for depression. The patient is not nervous/anxious and does not have insomnia. Physical Exam  Vitals and nursing note reviewed. Constitutional:       Appearance: She is well-developed. HENT:      Head: Normocephalic and atraumatic.    Eyes:      Conjunctiva/sclera: Conjunctivae normal.   Cardiovascular:      Rate and Rhythm: Normal rate and regular rhythm. Heart sounds: Normal heart sounds. No murmur heard. Pulmonary:      Effort: Pulmonary effort is normal.      Breath sounds: Wheezing present. Abdominal:      General: Bowel sounds are normal. There is no distension. Palpations: Abdomen is soft. Musculoskeletal:         General: Normal range of motion. Cervical back: Normal range of motion and neck supple. Lymphadenopathy:      Cervical: Cervical adenopathy present. Skin:     Findings: No erythema. Neurological:      Mental Status: She is alert and oriented to person, place, and time. Psychiatric:         Behavior: Behavior normal.       ASSESSMENT and PLAN    ICD-10-CM ICD-9-CM    1. Simple chronic bronchitis (HCC)  J41.0 491.0 fluticasone propion-salmeteroL (ADVAIR/WIXELA) 100-50 mcg/dose diskus inhaler      doxycycline (VIBRAMYCIN) 100 mg capsule      methylPREDNISolone (MEDROL DOSEPACK) 4 mg tablet      REFERRAL TO PULMONARY DISEASE      2.  Advice given about COVID-19 virus infection  Z71.89 V65.49 fluticasone propion-salmeteroL (ADVAIR/WIXELA) 100-50 mcg/dose diskus inhaler      doxycycline (VIBRAMYCIN) 100 mg capsule      methylPREDNISolone (MEDROL DOSEPACK) 4 mg tablet      REFERRAL TO PULMONARY DISEASE        lab results and schedule of future lab studies reviewed with patient  reviewed medications and side effects in detail   pt was advised about the covid protection with vaccination, and to Wear a facemask , having social distance, and to get tested if possible,     patient acknowledged understanding and agreed with today's recommendations,

## 2022-08-19 ENCOUNTER — OFFICE VISIT (OUTPATIENT)
Dept: FAMILY MEDICINE CLINIC | Age: 37
End: 2022-08-19
Payer: COMMERCIAL

## 2022-08-19 VITALS
SYSTOLIC BLOOD PRESSURE: 112 MMHG | WEIGHT: 133.2 LBS | DIASTOLIC BLOOD PRESSURE: 78 MMHG | OXYGEN SATURATION: 98 % | RESPIRATION RATE: 16 BRPM | HEART RATE: 88 BPM | BODY MASS INDEX: 22.17 KG/M2 | TEMPERATURE: 98.1 F

## 2022-08-19 DIAGNOSIS — Z02.71 DISABILITY EXAMINATION: ICD-10-CM

## 2022-08-19 DIAGNOSIS — U07.1 BRONCHITIS DUE TO 2019 NOVEL CORONAVIRUS: Primary | ICD-10-CM

## 2022-08-19 DIAGNOSIS — Z71.89 ADVICE GIVEN ABOUT COVID-19 VIRUS INFECTION: ICD-10-CM

## 2022-08-19 DIAGNOSIS — J40 BRONCHITIS DUE TO 2019 NOVEL CORONAVIRUS: Primary | ICD-10-CM

## 2022-08-19 PROCEDURE — 99214 OFFICE O/P EST MOD 30 MIN: CPT | Performed by: FAMILY MEDICINE

## 2022-08-19 NOTE — PROGRESS NOTES
HISTORY OF PRESENT ILLNESS  Juli Ascencio is a 42 y.o. female, present with Face to face intermittent disability examination 4 patient's chronic medical condition, Pt states that the pt is feeling great , And willing to go back to work at any time, patient is very happy with the progress stating that the medication and the medical staff, family support and family members,    they all doing a great job while patient is in the asthmatic exacerbation, patient is able to do activity and instrumental activity of the daily livings without any problem, unfortunately sometimes, the medical condition flares and if it flares up, the pt is not able to do any of the mention activity so that the pt need to do resting and stay off work in order not to make any mistake to create abnnormal workplace, and today the patient has no concern except for a completion of the form which is few pages of questions, so that the pt has some documentation support if any of the current medical condition flares, patient also states that if the flares happened, no medication has been able to help the flareup except for resting at home, patient will get into an irritated state current inhaler maker palpitated even more and therefore patient would become dysfunctional to perform and to function at the workplace. Patient is currently mentally alert and cognitively functional to operate machinery, and motivated and willing to Go to work full time. Asthma  The patient has had no frequent daytime and nighttime asthma symptoms, patient currently has not been taking the short-acting bronchodilator over last couple weeks,  The patient is using short-acting beta agonists for symptom control , compliant with meds, + RF needed,     Current Outpatient Medications   Medication Sig Dispense Refill    fluticasone propion-salmeteroL (ADVAIR/WIXELA) 100-50 mcg/dose diskus inhaler Take 1 Puff by inhalation every twelve (12) hours.  60 Each 3    doxycycline (VIBRAMYCIN) 100 mg capsule Take 1 Capsule by mouth two (2) times a day for 10 days. 20 Capsule 0    methylPREDNISolone (MEDROL DOSEPACK) 4 mg tablet As directed for 6 days one package 1 Dose Pack 0    guaiFENesin ER (MUCINEX) 600 mg ER tablet Take 1 Tablet by mouth every twelve (12) hours. (Patient not taking: Reported on 8/17/2022) 30 Tablet 0    L.acid,para-B. bifidum-S.therm (RISAQUAD) 8 billion cell cap cap Take 1 Capsule by mouth daily. (Patient not taking: Reported on 8/17/2022) 30 Capsule 0    montelukast (SINGULAIR) 10 mg tablet Take 1 Tablet by mouth nightly. (Patient not taking: Reported on 8/17/2022) 30 Tablet 0    albuterol-ipratropium (DUO-NEB) 2.5 mg-0.5 mg/3 ml nebu 3 mL by Nebulization route every two (2) hours as needed for Wheezing, Shortness of Breath, Respiratory Distress or Cough. (Patient not taking: Reported on 8/17/2022) 30 Nebule 0    albuterol (PROVENTIL HFA, VENTOLIN HFA, PROAIR HFA) 90 mcg/actuation inhaler Take 1 Puff by inhalation every four (4) hours as needed for Wheezing or Shortness of Breath. 18 g 0    Nebulizers misc Nebulizer Machine and Accessories  ( Diagnosis Asthma ) (Patient not taking: Reported on 8/17/2022) 1 Each 0     No Known Allergies  Past Medical History:   Diagnosis Date    Asthma      No past surgical history on file. Family History   Problem Relation Age of Onset    Depression Mother     Anxiety Mother     No Known Problems Father      Social History     Tobacco Use    Smoking status: Never     Passive exposure: Never    Smokeless tobacco: Never   Substance Use Topics    Alcohol use: Yes     Comment: social      Lab Results   Component Value Date/Time    Glucose 159 (H) 07/10/2022 10:23 AM    Creatinine 0.62 07/10/2022 10:23 AM      No results found for: CHOL, CHOLPOCT, HDL, LDL, LDLC, LDLCPOC, LDLCEXT, TRIGL, TGLPOCT, CHHD, CHHDX     Review of Systems   Constitutional:  Negative for chills and fever. HENT:  Negative for congestion and nosebleeds.     Eyes: Negative for blurred vision and pain. Respiratory:  Negative for cough, shortness of breath and wheezing. Cardiovascular:  Negative for chest pain and leg swelling. Gastrointestinal:  Negative for constipation, diarrhea, nausea and vomiting. Genitourinary:  Negative for dysuria and frequency. Musculoskeletal:  Negative for joint pain and myalgias. Skin:  Negative for itching and rash. Neurological:  Negative for dizziness, loss of consciousness and headaches. Psychiatric/Behavioral:  Negative for depression. The patient is not nervous/anxious and does not have insomnia. Physical Exam  Vitals and nursing note reviewed. Constitutional:       Appearance: She is well-developed. HENT:      Head: Normocephalic and atraumatic. Eyes:      Conjunctiva/sclera: Conjunctivae normal.      Pupils: Pupils are equal, round, and reactive to light. Neck:      Thyroid: No thyromegaly. Vascular: No JVD. Cardiovascular:      Rate and Rhythm: Normal rate and regular rhythm. Heart sounds: Normal heart sounds. No murmur heard. No friction rub. No gallop. Pulmonary:      Effort: Pulmonary effort is normal. No respiratory distress. Breath sounds: Normal breath sounds. No stridor. No wheezing or rales. Abdominal:      General: Bowel sounds are normal. There is no distension. Palpations: Abdomen is soft. There is no mass. Tenderness: There is no abdominal tenderness. Musculoskeletal:         General: No tenderness. Normal range of motion. Lymphadenopathy:      Cervical: No cervical adenopathy. Skin:     Findings: No erythema or rash. Neurological:      Mental Status: She is alert and oriented to person, place, and time. Cranial Nerves: No cranial nerve deficit. Deep Tendon Reflexes: Reflexes are normal and symmetric. Psychiatric:         Behavior: Behavior normal.       ASSESSMENT and PLAN    ICD-10-CM ICD-9-CM    1.  Bronchitis due to 2019 novel coronavirus U07.1 490 XR CHEST PA LAT    J40 079.89       2. Disability examination  Z02.71 V68.01 XR CHEST PA LAT      3.  Advice given about COVID-19 virus infection  Z71.89 V65.49       Secondary to the patient chronic medical conditions,   form was completed, w/ with multiple questions answered to the best knowledge will keep a copy in the chart for further correspondence patient was given signed completed patient was informed about the safety and  regulations regarding this condition patient was also advised to follow-up with HR for further guidelines patient acknowledged understanding and agreed with today's recommendations    lab results and schedule of future lab studies reviewed with patient  reviewed medications and side effects in detail

## 2022-08-19 NOTE — PROGRESS NOTES
Chief Complaint   Patient presents with    Documentation     asthma     1. Have you been to the ER, urgent care clinic since your last visit? Hospitalized since your last visit? No    2. Have you seen or consulted any other health care providers outside of the [address] [place] [address] since your last visit? Include any pap smears or colon screening. No    verified patient with two type of identifiers. requesting [facility] documentation for asthma- form received.

## 2022-11-28 ENCOUNTER — PATIENT MESSAGE (OUTPATIENT)
Dept: FAMILY MEDICINE CLINIC | Age: 37
End: 2022-11-28

## 2022-11-28 DIAGNOSIS — Z71.89 ADVICE GIVEN ABOUT COVID-19 VIRUS INFECTION: ICD-10-CM

## 2022-11-28 DIAGNOSIS — J41.0 SIMPLE CHRONIC BRONCHITIS (HCC): ICD-10-CM

## 2022-11-30 RX ORDER — FLUTICASONE PROPIONATE AND SALMETEROL 100; 50 UG/1; UG/1
1 POWDER RESPIRATORY (INHALATION) EVERY 12 HOURS
Qty: 60 EACH | Refills: 3 | Status: SHIPPED | OUTPATIENT
Start: 2022-11-30

## 2022-11-30 RX ORDER — ALBUTEROL SULFATE 90 UG/1
1 AEROSOL, METERED RESPIRATORY (INHALATION)
Qty: 18 G | Refills: 0 | Status: SHIPPED | OUTPATIENT
Start: 2022-11-30

## 2023-01-04 RX ORDER — ALBUTEROL SULFATE 90 UG/1
AEROSOL, METERED RESPIRATORY (INHALATION)
Qty: 18 EACH | Refills: 3 | Status: SHIPPED | OUTPATIENT
Start: 2023-01-04

## 2023-01-30 ENCOUNTER — TRANSCRIBE ORDER (OUTPATIENT)
Dept: GENERAL RADIOLOGY | Age: 38
End: 2023-01-30

## 2023-01-30 ENCOUNTER — HOSPITAL ENCOUNTER (OUTPATIENT)
Dept: GENERAL RADIOLOGY | Age: 38
Discharge: HOME | End: 2023-01-30
Payer: COMMERCIAL

## 2023-01-30 DIAGNOSIS — Z87.01 PERSONAL HISTORY OF PNEUMONIA (RECURRENT): ICD-10-CM

## 2023-01-30 DIAGNOSIS — Z87.01 PERSONAL HISTORY OF PNEUMONIA (RECURRENT): Primary | ICD-10-CM

## 2023-01-30 PROCEDURE — 71046 X-RAY EXAM CHEST 2 VIEWS: CPT

## 2023-07-28 RX ORDER — ALBUTEROL SULFATE 90 UG/1
AEROSOL, METERED RESPIRATORY (INHALATION)
Qty: 3 EACH | Refills: 1 | Status: SHIPPED | OUTPATIENT
Start: 2023-07-28

## 2023-10-25 ENCOUNTER — OFFICE VISIT (OUTPATIENT)
Age: 38
End: 2023-10-25

## 2023-10-25 ENCOUNTER — NURSE TRIAGE (OUTPATIENT)
Dept: OTHER | Facility: CLINIC | Age: 38
End: 2023-10-25

## 2023-10-25 VITALS
DIASTOLIC BLOOD PRESSURE: 71 MMHG | SYSTOLIC BLOOD PRESSURE: 108 MMHG | HEIGHT: 65 IN | HEART RATE: 83 BPM | WEIGHT: 133 LBS | RESPIRATION RATE: 18 BRPM | BODY MASS INDEX: 22.16 KG/M2 | TEMPERATURE: 98.5 F | OXYGEN SATURATION: 98 %

## 2023-10-25 DIAGNOSIS — R06.2 WHEEZING: ICD-10-CM

## 2023-10-25 DIAGNOSIS — J18.9 COMMUNITY ACQUIRED PNEUMONIA, UNSPECIFIED LATERALITY: Primary | ICD-10-CM

## 2023-10-25 DIAGNOSIS — R05.1 ACUTE COUGH: ICD-10-CM

## 2023-10-25 RX ORDER — PREDNISONE 20 MG/1
40 TABLET ORAL DAILY
Qty: 10 TABLET | Refills: 0 | Status: SHIPPED | OUTPATIENT
Start: 2023-10-25 | End: 2023-10-30

## 2023-10-25 RX ORDER — AMOXICILLIN AND CLAVULANATE POTASSIUM 875; 125 MG/1; MG/1
1 TABLET, FILM COATED ORAL 2 TIMES DAILY
Qty: 14 TABLET | Refills: 0 | Status: SHIPPED | OUTPATIENT
Start: 2023-10-25 | End: 2023-11-01

## 2023-10-25 RX ORDER — DOXYCYCLINE HYCLATE 100 MG
100 TABLET ORAL 2 TIMES DAILY
Qty: 14 TABLET | Refills: 0 | Status: SHIPPED | OUTPATIENT
Start: 2023-10-25 | End: 2023-11-01

## 2023-10-25 RX ORDER — ALBUTEROL SULFATE 90 UG/1
AEROSOL, METERED RESPIRATORY (INHALATION)
Qty: 3 EACH | Refills: 1 | Status: SHIPPED | OUTPATIENT
Start: 2023-10-25

## 2023-10-25 NOTE — TELEPHONE ENCOUNTER
[facility]    Received call from [facility] with Red Flag Complaint. Subjective: Caller states \" SOB, cough, wheezing, ashtma reated symptoms. \"     Current Symptoms:   \"Started last month, I have a history of pneumonia so I feel that may be what it is turning into. \"    \"When I am moving around a lot I get really SOB, went through 3 inhlaers in 2 months. \"    Coughing up yellow mucous. \"SOB wakes me up in the middle of the night. \"    Onset: 1 month ago; worsening    Associated Symptoms: NA    Pain Severity: 0/10; N/A; none    Temperature: Fever 3 weeks ago that lasted 3 days, no fevere now      What has been tried: NA    LMP:  3 weeks ago  Pregnant: No    Recommended disposition: Go to [facility]    Care advice provided, patient verbalizes understanding; denies any other questions or concerns; instructed to call back for any new or worsening symptoms. Patient/Caller agrees with recommended disposition; writer provided warm transfer to [facility] [place] for appointment scheduling    Attention Provider: Thank you for allowing me to participate in the care of your patient. The patient was connected to triage in response to information provided to the ECC/PSC. Please do not respond through this encounter as the response is not directed to a shared pool.     Reason for Disposition   MILD difficulty breathing (e.g., minimal/no SOB at rest, SOB with walking, pulse <100) and still present when not coughing    Protocols used: Cough-ADULT-OH

## 2023-10-26 NOTE — PATIENT INSTRUCTIONS
Antibiotics as ordered  Will call tomorrow morning with xray results  Albuterol inhaler every 4-6 hours as needed for wheezing and shortness of breath  Prednisone for wheezing and shortness of breath  Go to the emergency room with any severe shortness of breath or acute worsening of symptoms

## 2024-04-15 DIAGNOSIS — R06.2 WHEEZING: ICD-10-CM

## 2024-04-16 DIAGNOSIS — R06.2 WHEEZING: ICD-10-CM

## 2024-04-16 NOTE — TELEPHONE ENCOUNTER
Last appointment: 8/19/22  Next appointment: 4/29/24  Previous refill encounter(s): 12/21/23 #3 with 1 refill    Requested Prescriptions     Pending Prescriptions Disp Refills    albuterol sulfate HFA (PROVENTIL;VENTOLIN;PROAIR) 108 (90 Base) MCG/ACT inhaler 3 each 1     Sig: TAKE 1 PUFF BY INHALATION EVERY FOUR HOURS AS NEEDED FOR WHEEZING OR SHORTNESS OF BREATH.         For Pharmacy Admin Tracking Only    Program: Medication Refill  CPA in place:    Recommendation Provided To:   Intervention Detail: New Rx: 1, reason: Patient Preference  Intervention Accepted By:   Gap Closed?:    Time Spent (min): 5

## 2024-04-18 RX ORDER — ALBUTEROL SULFATE 90 UG/1
AEROSOL, METERED RESPIRATORY (INHALATION)
Qty: 3 EACH | Refills: 1 | OUTPATIENT
Start: 2024-04-18

## 2024-04-18 RX ORDER — ALBUTEROL SULFATE 90 UG/1
AEROSOL, METERED RESPIRATORY (INHALATION)
Qty: 3 EACH | Refills: 1 | Status: SHIPPED | OUTPATIENT
Start: 2024-04-18

## 2024-04-29 ENCOUNTER — OFFICE VISIT (OUTPATIENT)
Age: 39
End: 2024-04-29
Payer: COMMERCIAL

## 2024-04-29 VITALS
TEMPERATURE: 97.4 F | RESPIRATION RATE: 20 BRPM | HEART RATE: 75 BPM | DIASTOLIC BLOOD PRESSURE: 71 MMHG | OXYGEN SATURATION: 99 % | WEIGHT: 129.8 LBS | BODY MASS INDEX: 21.6 KG/M2 | SYSTOLIC BLOOD PRESSURE: 105 MMHG

## 2024-04-29 DIAGNOSIS — J41.0 SIMPLE CHRONIC BRONCHITIS (HCC): Primary | ICD-10-CM

## 2024-04-29 PROCEDURE — 99213 OFFICE O/P EST LOW 20 MIN: CPT | Performed by: FAMILY MEDICINE

## 2024-04-29 SDOH — ECONOMIC STABILITY: HOUSING INSECURITY
IN THE LAST 12 MONTHS, WAS THERE A TIME WHEN YOU DID NOT HAVE A STEADY PLACE TO SLEEP OR SLEPT IN A SHELTER (INCLUDING NOW)?: PATIENT DECLINED

## 2024-04-29 SDOH — ECONOMIC STABILITY: FOOD INSECURITY: WITHIN THE PAST 12 MONTHS, THE FOOD YOU BOUGHT JUST DIDN'T LAST AND YOU DIDN'T HAVE MONEY TO GET MORE.: PATIENT DECLINED

## 2024-04-29 SDOH — ECONOMIC STABILITY: INCOME INSECURITY: HOW HARD IS IT FOR YOU TO PAY FOR THE VERY BASICS LIKE FOOD, HOUSING, MEDICAL CARE, AND HEATING?: PATIENT DECLINED

## 2024-04-29 SDOH — ECONOMIC STABILITY: HOUSING INSECURITY

## 2024-04-29 SDOH — ECONOMIC STABILITY: FOOD INSECURITY

## 2024-04-29 SDOH — ECONOMIC STABILITY: FOOD INSECURITY: WITHIN THE PAST 12 MONTHS, YOU WORRIED THAT YOUR FOOD WOULD RUN OUT BEFORE YOU GOT MONEY TO BUY MORE.: PATIENT DECLINED

## 2024-04-29 SDOH — ECONOMIC STABILITY: INCOME INSECURITY

## 2024-04-29 ASSESSMENT — PATIENT HEALTH QUESTIONNAIRE - PHQ9
SUM OF ALL RESPONSES TO PHQ QUESTIONS 1-9: 0
2. FEELING DOWN, DEPRESSED OR HOPELESS: NOT AT ALL
SUM OF ALL RESPONSES TO PHQ QUESTIONS 1-9: 0
SUM OF ALL RESPONSES TO PHQ QUESTIONS 1-9: 0
SUM OF ALL RESPONSES TO PHQ9 QUESTIONS 1 & 2: 0
SUM OF ALL RESPONSES TO PHQ QUESTIONS 1-9: 0
1. LITTLE INTEREST OR PLEASURE IN DOING THINGS: NOT AT ALL

## 2024-04-29 NOTE — PROGRESS NOTES
Chief Complaint   Patient presents with    Follow-up     Seen at urgent care last Monday(Pneumonia)       \"Have you been to the ER, urgent care clinic since your last visit?  Hospitalized since your last visit?\"    YES - When: approximately 1  weeks ago.  Where and Why: Urgent care(pneumonia).    “Have you seen or consulted any other health care providers outside of [facility] since your last visit?”    YES - When: approximately 1  weeks ago.  Where and Why: Urgent care [facility].        “Have you had a pap smear?”    NO    No cervical cancer screening on file          There were no vitals filed for this visit.   Health Maintenance Due   Topic Date Due    Hepatitis B vaccine (1 of 3 - 3-dose series) Never done    COVID-19 Vaccine (1) Never done    Varicella vaccine (1 of 2 - 2-dose childhood series) Never done    Pneumococcal 0-64 years Vaccine (1 of 2 - PCV) Never done    HIV screen  Never done    Hepatitis C screen  Never done    DTaP/Tdap/Td vaccine (1 - Tdap) Never done    Cervical cancer screen  Never done    Lipids  Never done    Depression Screen  2023      The patient, Miladis Figueroa, identity was verified by name and .

## 2024-04-29 NOTE — PROGRESS NOTES
Miladis Figueroa (:  1985) is a 38 y.o. female,Established patient, here for evaluation of the following chief complaint(s):  Follow-up (Seen at urgent care last Monday(Pneumonia))    Patient present with history of questionable asthmatic state with recurrent pneumonia infection she states that she never been tested for asthmatic states she is currently not vaccinated with recommended vaccinations such as flu shot and pneumonia vaccine offered but opted at this time, denies any cough wheezing currently has been taking albuterol as needed having Pulmicort but not been using it having singular but not been using it denies any tobacco use or vaping, patient stated that she has been using short acting bronchodilator as needed no night awakening for shortness of breath and she is able to do instrumental activities of her daily living on regular basis without problem    Constitutional: no chills and fever,  , nad     HENT: no ear pain or nosebleeds. No blurred vision  Respiratory: no shortness of breath, wheezing cough   Cardiovascular: Has no chest pain, ,and racing heart .   Gastrointestinal: No constipation, diarrhea, nausea and vomiting.   Genitourinary: No frequency.   Musculoskeletal: Negative for joint pain.   Skin: no itching, no rash.   Neurological: Negative for dizziness, no tremors  Psychiatric/Behavioral: Negative for depression, is not nervous/anxious.        General:  alert cooperative appears stated for the age  HEENT; normocephalic and atraumatic PERRLA extraocular motor intact normal tympanic membrane normal external ear bilaterally, mucosal normal no drainage  Neck: supple no adenopathy no JVD no bruit  Lungs: Clear to auscultation bilaterally no rales rhonchi or wheezes  Heart: Normal regular S1-S2 ,  no murmur  Breast exam deferred  Abdomen: Soft nontender normal bowel sounds   Extremities: Normal range of motion no point tenderness normal pulses no edema  Pelvic/: No lesion, no

## 2024-10-28 ENCOUNTER — TRANSCRIBE ORDERS (OUTPATIENT)
Facility: HOSPITAL | Age: 39
End: 2024-10-28

## 2024-10-28 DIAGNOSIS — R93.89 ABNORMAL CXR (CHEST X-RAY): Primary | ICD-10-CM

## 2024-11-01 ENCOUNTER — HOSPITAL ENCOUNTER (OUTPATIENT)
Age: 39
Discharge: HOME | End: 2024-11-04
Payer: COMMERCIAL

## 2024-11-01 DIAGNOSIS — R93.89 ABNORMAL CXR (CHEST X-RAY): ICD-10-CM

## 2024-11-01 PROCEDURE — 6360000004 HC RX CONTRAST MEDICATION: Performed by: STUDENT IN AN ORGANIZED HEALTH CARE EDUCATION/TRAINING PROGRAM

## 2024-11-01 PROCEDURE — 71260 CT THORAX DX C+: CPT

## 2024-11-01 RX ORDER — IOPAMIDOL 755 MG/ML
100 INJECTION, SOLUTION INTRAVASCULAR
Status: COMPLETED | OUTPATIENT
Start: 2024-11-01 | End: 2024-11-01

## 2024-11-01 RX ADMIN — IOPAMIDOL 100 ML: 755 INJECTION, SOLUTION INTRAVENOUS at 08:53
